# Patient Record
Sex: MALE | Race: WHITE | NOT HISPANIC OR LATINO | Employment: OTHER | URBAN - METROPOLITAN AREA
[De-identification: names, ages, dates, MRNs, and addresses within clinical notes are randomized per-mention and may not be internally consistent; named-entity substitution may affect disease eponyms.]

---

## 2023-02-21 LAB
CALCIDIOL (25 OH VITAMIN D3) (NG/ML) IN SER/PLAS: 51 NG/ML
COBALAMIN (VITAMIN B12) (PG/ML) IN SER/PLAS: 178 PG/ML (ref 211–911)
ERYTHROCYTE DISTRIBUTION WIDTH (RATIO) BY AUTOMATED COUNT: 13.2 % (ref 11.5–14.5)
ERYTHROCYTE MEAN CORPUSCULAR HEMOGLOBIN CONCENTRATION (G/DL) BY AUTOMATED: 33.3 G/DL (ref 32–36)
ERYTHROCYTE MEAN CORPUSCULAR VOLUME (FL) BY AUTOMATED COUNT: 97 FL (ref 80–100)
ERYTHROCYTES (10*6/UL) IN BLOOD BY AUTOMATED COUNT: 4.32 X10E12/L (ref 4.5–5.9)
HEMATOCRIT (%) IN BLOOD BY AUTOMATED COUNT: 42.1 % (ref 41–52)
HEMOGLOBIN (G/DL) IN BLOOD: 14 G/DL (ref 13.5–17.5)
LEUKOCYTES (10*3/UL) IN BLOOD BY AUTOMATED COUNT: 8.6 X10E9/L (ref 4.4–11.3)
PLATELETS (10*3/UL) IN BLOOD AUTOMATED COUNT: 178 X10E9/L (ref 150–450)

## 2023-06-23 DIAGNOSIS — I10 HYPERTENSION, UNSPECIFIED TYPE: Primary | ICD-10-CM

## 2023-06-23 RX ORDER — METOPROLOL TARTRATE 50 MG/1
50 TABLET ORAL DAILY
COMMUNITY
End: 2023-06-23 | Stop reason: SDUPTHER

## 2023-06-24 RX ORDER — METOPROLOL TARTRATE 50 MG/1
50 TABLET ORAL DAILY
Qty: 30 TABLET | Refills: 2 | Status: SHIPPED | OUTPATIENT
Start: 2023-06-24 | End: 2023-06-27 | Stop reason: SDUPTHER

## 2023-06-27 DIAGNOSIS — I10 HYPERTENSION, UNSPECIFIED TYPE: ICD-10-CM

## 2023-06-27 RX ORDER — METOPROLOL TARTRATE 50 MG/1
TABLET ORAL
Qty: 90 TABLET | Refills: 3 | Status: SHIPPED | OUTPATIENT
Start: 2023-06-27

## 2023-07-14 ENCOUNTER — PATIENT OUTREACH (OUTPATIENT)
Dept: PRIMARY CARE | Facility: CLINIC | Age: 83
End: 2023-07-14
Payer: MEDICARE

## 2023-07-14 RX ORDER — OLMESARTAN MEDOXOMIL 40 MG/1
1 TABLET ORAL DAILY
COMMUNITY
Start: 2018-08-27 | End: 2023-07-28 | Stop reason: SDUPTHER

## 2023-07-14 RX ORDER — ASPIRIN 81 MG/1
1 TABLET ORAL DAILY
COMMUNITY
Start: 2021-10-06

## 2023-07-14 RX ORDER — ROSUVASTATIN CALCIUM 10 MG/1
1 TABLET, FILM COATED ORAL DAILY
COMMUNITY
Start: 2017-04-11

## 2023-07-14 NOTE — PROGRESS NOTES
Discharge Facility: [facility]  Discharge Diagnosis: syncopal episode related to complete heart block, s/p dual chamber PPM  Admission Date: 7/9/23  Discharge Date: 7/13/23    PCP Appointment Date: 7/20/23  Specialist Appointment Date:   Follow-Up Appointment 02:           Physician/Dept/Service:   [facility] Anupam          Reason for Referral:   Incision Check          Scheduled Date/Time:   19-Jul-2023 08:30          Location:   [facility] [address]          Phone Number:   [phone]    Follow-Up Appointment 03:           Physician/Dept/Service:   [facility]          Reason for Referral:   Chest X-ray/Device Check          Scheduled Date/Time:   16-Oct-2023 08:00          Location:   [facility]          Phone Number:   [phone]    Follow-Up Appointment 04:           Physician/Dept/Service:   JERALD Souza          Reason for Referral:   follow up          Scheduled Date/Time:   16-Oct-2023 09:30  Hospital Encounter and Summary: Linked   See discharge assessment below for further details    Medications  Medications reviewed with patient/caregiver?: Yes (new/changes only) (7/14/2023 11:03 AM)  Is the patient having any side effects they believe may be caused by any medication additions or changes?: No (7/14/2023 11:03 AM)  Does the patient have all medications ordered at discharge?: Yes (7/14/2023 11:03 AM)  Care Management Interventions: No intervention needed (7/14/2023 11:03 AM)  Is the patient taking all medications as directed (includes completed medication regime)?: Yes (7/14/2023 11:03 AM)  Care Management Interventions: Provided patient education (7/14/2023 11:03 AM)  Medication Comments: Stop lisinopril (7/14/2023 11:03 AM)    Appointments  Does the patient have a primary care provider?: Yes (7/14/2023 11:03 AM)  Care Management Interventions: Verified appointment date/time/provider (7/14/2023 11:03 AM)  Care Management Interventions: Advised patient to keep appointment (7/14/2023 11:03  AM)    Self Management  Has home health visited the patient within 72 hours of discharge?: Not applicable (7/14/2023 11:03 AM)  What Durable Medical Equipment (DME) was ordered?: n/a (7/14/2023 11:03 AM)    Patient Teaching  Does the patient have access to their discharge instructions?: Yes (7/14/2023 11:03 AM)  Care Management Interventions: Reviewed instructions with patient (7/14/2023 11:03 AM)  What is the patient's perception of their health status since discharge?: Improving (7/14/2023 11:03 AM)  Is the patient/caregiver able to teach back the hierarchy of who to call/visit for symptoms/problems? PCP, Specialist, Home Health nurse, Urgent Care, ED, 911: Yes (7/14/2023 11:03 AM)  Patient/Caregiver Education Comments: Patient reports he is a little sore from the surgery but is doing better. (7/14/2023 11:03 AM)

## 2023-07-21 ENCOUNTER — TELEPHONE (OUTPATIENT)
Dept: PRIMARY CARE | Facility: CLINIC | Age: 83
End: 2023-07-21
Payer: MEDICARE

## 2023-07-22 NOTE — TELEPHONE ENCOUNTER
Pt needs to contact [facility], they should have several refills for each of those meds. He needs to ask them for refills. thanks

## 2023-07-28 DIAGNOSIS — I10 HYPERTENSION, UNSPECIFIED TYPE: ICD-10-CM

## 2023-07-28 RX ORDER — OLMESARTAN MEDOXOMIL 40 MG/1
40 TABLET ORAL DAILY
Qty: 90 TABLET | Refills: 0 | Status: SHIPPED | OUTPATIENT
Start: 2023-07-28 | End: 2023-10-16 | Stop reason: ALTCHOICE

## 2023-07-28 RX ORDER — METOPROLOL TARTRATE 50 MG/1
TABLET ORAL
Qty: 90 TABLET | Refills: 3 | Status: CANCELLED | OUTPATIENT
Start: 2023-07-28

## 2023-08-01 ENCOUNTER — PATIENT OUTREACH (OUTPATIENT)
Dept: PRIMARY CARE | Facility: CLINIC | Age: 83
End: 2023-08-01
Payer: MEDICARE

## 2023-08-01 NOTE — PROGRESS NOTES
Call regarding recent hospitalization. Patient has not had follow up with PCP.  At time of outreach call the patient feels as if their condition has improved since last visit.  Patient denies additional questions/concerns at this time.

## 2023-08-31 ENCOUNTER — PATIENT OUTREACH (OUTPATIENT)
Dept: PRIMARY CARE | Facility: CLINIC | Age: 83
End: 2023-08-31
Payer: MEDICARE

## 2023-08-31 NOTE — PROGRESS NOTES
Call regarding 2 month discharge follow up.   At time of outreach call the patient feels as if their condition has returned to baseline since last visit.  Reviewed the PCP appointment with the pt and addressed any questions or concerns.

## 2023-09-12 PROBLEM — N52.9 ERECTILE DYSFUNCTION: Status: ACTIVE | Noted: 2023-09-12

## 2023-09-12 PROBLEM — I25.10 CAD (CORONARY ARTERY DISEASE): Status: ACTIVE | Noted: 2023-09-12

## 2023-09-12 PROBLEM — Z95.0 PACEMAKER: Status: ACTIVE | Noted: 2023-09-12

## 2023-09-12 PROBLEM — I44.2 COMPLETE HEART BLOCK (MULTI): Status: ACTIVE | Noted: 2023-09-12

## 2023-09-12 PROBLEM — Z78.9 KNOWN MEDICAL PROBLEMS: Status: ACTIVE | Noted: 2023-09-12

## 2023-09-12 PROBLEM — Z79.899 DRUG THERAPY: Status: ACTIVE | Noted: 2023-09-12

## 2023-09-12 PROBLEM — R32 URINARY INCONTINENCE: Status: ACTIVE | Noted: 2023-09-12

## 2023-09-12 PROBLEM — E78.5 DYSLIPIDEMIA: Status: ACTIVE | Noted: 2023-09-12

## 2023-09-12 PROBLEM — E55.9 VITAMIN D DEFICIENCY: Status: ACTIVE | Noted: 2023-09-12

## 2023-09-12 PROBLEM — R55 SYNCOPE, NEAR: Status: ACTIVE | Noted: 2023-09-12

## 2023-09-12 PROBLEM — I65.29 ASYMPTOMATIC CAROTID ARTERY STENOSIS: Status: ACTIVE | Noted: 2023-09-12

## 2023-09-12 PROBLEM — Z78.9 NEVER SMOKED CIGARETTES: Status: ACTIVE | Noted: 2023-09-12

## 2023-09-12 PROBLEM — I10 HTN (HYPERTENSION): Status: ACTIVE | Noted: 2023-09-12

## 2023-09-12 PROBLEM — R41.82 ALTERED MENTAL STATUS: Status: ACTIVE | Noted: 2023-09-12

## 2023-09-12 PROBLEM — E66.3 OVERWEIGHT: Status: ACTIVE | Noted: 2023-09-12

## 2023-09-12 PROBLEM — R55 SYNCOPE: Status: ACTIVE | Noted: 2023-09-12

## 2023-09-12 PROBLEM — K21.9 GERD (GASTROESOPHAGEAL REFLUX DISEASE): Status: ACTIVE | Noted: 2023-09-12

## 2023-09-12 PROBLEM — I25.709: Status: ACTIVE | Noted: 2023-09-12

## 2023-09-12 PROBLEM — I10 MALIGNANT ESSENTIAL HYPERTENSION: Status: ACTIVE | Noted: 2023-09-12

## 2023-09-12 PROBLEM — E78.5 HYPERLIPIDEMIA: Status: ACTIVE | Noted: 2023-09-12

## 2023-09-12 PROBLEM — Z95.0 CARDIAC PACEMAKER: Status: ACTIVE | Noted: 2023-09-12

## 2023-09-12 RX ORDER — SILDENAFIL 50 MG/1
.5-2 TABLET, FILM COATED ORAL
COMMUNITY

## 2023-09-12 RX ORDER — ONDANSETRON 4 MG/1
1 TABLET, ORALLY DISINTEGRATING ORAL EVERY 4 HOURS PRN
COMMUNITY
Start: 2020-03-13 | End: 2023-10-16 | Stop reason: ALTCHOICE

## 2023-09-12 RX ORDER — OLMESARTAN MEDOXOMIL 20 MG/1
1 TABLET ORAL DAILY
COMMUNITY
Start: 2023-08-21 | End: 2024-03-12 | Stop reason: SDUPTHER

## 2023-09-12 RX ORDER — FAMOTIDINE 20 MG/1
1 TABLET, FILM COATED ORAL 2 TIMES DAILY
COMMUNITY
End: 2024-03-29 | Stop reason: SDUPTHER

## 2023-09-12 RX ORDER — ACETAMINOPHEN 325 MG/1
2 TABLET ORAL EVERY 4 HOURS PRN
COMMUNITY
Start: 2023-07-13 | End: 2023-10-16 | Stop reason: ALTCHOICE

## 2023-09-12 RX ORDER — BNT162B2 ORIGINAL AND OMICRON BA.4/BA.5 .1125; .1125 MG/2.25ML; MG/2.25ML
INJECTION, SUSPENSION INTRAMUSCULAR
COMMUNITY
Start: 2022-12-02 | End: 2024-04-16 | Stop reason: WASHOUT

## 2023-10-06 ENCOUNTER — PATIENT OUTREACH (OUTPATIENT)
Dept: PRIMARY CARE | Facility: CLINIC | Age: 83
End: 2023-10-06
Payer: MEDICARE

## 2023-10-06 NOTE — PROGRESS NOTES
Call placed regarding 90 days post discharge follow up call.  At time of outreach call the patient feels as if their condition has returned to baseline since initial visit with PCP or specialist.  Questions or concerns regarding recovery period addressed at this time.   Reviewed any PCP or specialists progress notes/labs/radiology reports if applicable and addressed any questions or concerns.

## 2023-10-16 ENCOUNTER — OFFICE VISIT (OUTPATIENT)
Dept: CARDIOLOGY | Facility: CLINIC | Age: 83
End: 2023-10-16
Payer: MEDICARE

## 2023-10-16 ENCOUNTER — HOSPITAL ENCOUNTER (OUTPATIENT)
Dept: CARDIOLOGY | Facility: HOSPITAL | Age: 83
Discharge: HOME | End: 2023-10-16
Payer: MEDICARE

## 2023-10-16 ENCOUNTER — HOSPITAL ENCOUNTER (OUTPATIENT)
Dept: RADIOLOGY | Facility: HOSPITAL | Age: 83
Discharge: HOME | End: 2023-10-16
Payer: MEDICARE

## 2023-10-16 VITALS
HEART RATE: 68 BPM | WEIGHT: 174 LBS | SYSTOLIC BLOOD PRESSURE: 108 MMHG | DIASTOLIC BLOOD PRESSURE: 62 MMHG | BODY MASS INDEX: 27.31 KG/M2 | HEIGHT: 67 IN

## 2023-10-16 DIAGNOSIS — I10 MALIGNANT ESSENTIAL HYPERTENSION: ICD-10-CM

## 2023-10-16 DIAGNOSIS — I25.709: ICD-10-CM

## 2023-10-16 DIAGNOSIS — Z95.0 PACEMAKER: ICD-10-CM

## 2023-10-16 DIAGNOSIS — I44.2 COMPLETE HEART BLOCK (MULTI): ICD-10-CM

## 2023-10-16 DIAGNOSIS — Z95.0 CARDIAC PACEMAKER: Primary | ICD-10-CM

## 2023-10-16 DIAGNOSIS — Z78.9 NEVER SMOKED CIGARETTES: ICD-10-CM

## 2023-10-16 DIAGNOSIS — I1A.0 RESISTANT HYPERTENSION: ICD-10-CM

## 2023-10-16 DIAGNOSIS — I44.1 MOBITZ TYPE II ATRIOVENTRICULAR BLOCK: ICD-10-CM

## 2023-10-16 DIAGNOSIS — I44.2 ATRIOVENTRICULAR BLOCK, COMPLETE (MULTI): ICD-10-CM

## 2023-10-16 DIAGNOSIS — Z95.0 PRESENCE OF CARDIAC PACEMAKER: ICD-10-CM

## 2023-10-16 DIAGNOSIS — E78.2 MIXED HYPERLIPIDEMIA: ICD-10-CM

## 2023-10-16 DIAGNOSIS — E78.5 DYSLIPIDEMIA: ICD-10-CM

## 2023-10-16 DIAGNOSIS — R55 SYNCOPE, UNSPECIFIED SYNCOPE TYPE: ICD-10-CM

## 2023-10-16 PROCEDURE — 71046 X-RAY EXAM CHEST 2 VIEWS: CPT | Performed by: RADIOLOGY

## 2023-10-16 PROCEDURE — 1159F MED LIST DOCD IN RCRD: CPT | Performed by: NURSE PRACTITIONER

## 2023-10-16 PROCEDURE — 1036F TOBACCO NON-USER: CPT | Performed by: NURSE PRACTITIONER

## 2023-10-16 PROCEDURE — 3074F SYST BP LT 130 MM HG: CPT | Performed by: NURSE PRACTITIONER

## 2023-10-16 PROCEDURE — 99214 OFFICE O/P EST MOD 30 MIN: CPT | Performed by: NURSE PRACTITIONER

## 2023-10-16 PROCEDURE — 1160F RVW MEDS BY RX/DR IN RCRD: CPT | Performed by: NURSE PRACTITIONER

## 2023-10-16 PROCEDURE — 93280 PM DEVICE PROGR EVAL DUAL: CPT

## 2023-10-16 PROCEDURE — 3078F DIAST BP <80 MM HG: CPT | Performed by: NURSE PRACTITIONER

## 2023-10-16 PROCEDURE — 93290 INTERROG DEV EVAL ICPMS IP: CPT | Performed by: INTERNAL MEDICINE

## 2023-10-16 PROCEDURE — 93280 PM DEVICE PROGR EVAL DUAL: CPT | Performed by: INTERNAL MEDICINE

## 2023-10-16 PROCEDURE — 71046 X-RAY EXAM CHEST 2 VIEWS: CPT

## 2023-10-16 NOTE — PROGRESS NOTES
CARDIOLOGY OFFICE VISIT      CHIEF COMPLAINT  Chief Complaint   Patient presents with    Device Check    Hospital Follow-up     Patient here today for a 91 day follow up         HISTORY OF PRESENT ILLNESS  HPI  The patient is a 83-year-old  male who is followed for syncope secondary to complete heart block.  He underwent placement of a transvenous pacemaker followed by dual-chamber pacemaker implant on July 12, 2023 and presents to the office today for follow-up evaluation.  He denies dizziness, lightheadedness, near or marvin syncope, chest pain, palpitations, shortness of breath, abdominal distention, or lower extremity edema since undergoing pacemaker implant.  He is accompanied by his wife for today's office visit.  The patient did not undergo a PA and lateral chest x-ray prior to today's visit.  Past Medical History  Past Medical History:   Diagnosis Date    Adverse effect of unspecified drugs, medicaments and biological substances, initial encounter 08/27/2018    Drug reaction, initial encounter    Personal history of diseases of the blood and blood-forming organs and certain disorders involving the immune mechanism 08/30/2022    History of anemia    Personal history of other specified conditions 12/09/2019    History of abdominal pain       Social History  Social History     Tobacco Use    Smoking status: Never    Smokeless tobacco: Never   Substance Use Topics    Alcohol use: Yes     Comment: rarely    Drug use: Not Currently       Family History     Family History   Problem Relation Name Age of Onset    Asthma Mother      Other (Death of natural cause) Mother      Hypertension Father      Coronary artery disease Father      Other (myocardial infarction) Father      Stroke Other Grandparent         CVA        Allergies:  Allergies   Allergen Reactions    Lisinopril Unknown        Outpatient Medications:  Current Outpatient Medications   Medication Instructions    aspirin 81 mg EC tablet 1 tablet,  oral, Daily    Crestor 10 mg tablet 1 tablet, oral, Daily    famotidine (Pepcid) 20 mg tablet 1 tablet, oral, 2 times daily, if needed for BREAKTHROUGH HEARTBURN    metoprolol tartrate (Lopressor) 50 mg tablet 1 po qd    olmesartan (BENIcar) 20 mg tablet 1 tablet, oral, Daily    Pfizer COVID Bival,12y up,,PF, 30 mcg/0.3 mL suspension vaccine     sildenafil (Viagra) 50 mg tablet 0.5-2 tablets, oral, 30 MINUTES prior to intercourse          REVIEW OF SYSTEMS  Review of Systems   All other systems reviewed and are negative.        VITALS  Vitals:    10/16/23 0916   BP: 108/62   Pulse: 68       PHYSICAL EXAM  Vitals and nursing note reviewed.   Constitutional:       Appearance: Normal appearance.   HENT:      Head: Normocephalic.   Neck:      Vascular: No JVD.   Cardiovascular:      Rate and Rhythm: Normal rate and regular rhythm.      Pulses: Normal pulses.      Heart sounds: Normal heart sounds.   Pulmonary:      Effort: Pulmonary effort is normal.      Breath sounds: Normal breath sounds.   Abdominal:      General: Bowel sounds are normal.      Palpations: Abdomen is soft.   Musculoskeletal:         General: Normal range of motion.      Cervical back: Normal range of motion.   Skin:     General: Skin is warm and dry.  Left subclavian pacemaker pocket is well-healed without redness swelling or drainage.  Neurological:      General: No focal deficit present.      Mental Status: She is alert and oriented to person, place, and time.      Motor: Motor function is intact.   Psychiatric:         Attention and Perception: Attention and perception normal.         Mood and Affect: Mood and affect normal.         Speech: Speech normal.         Behavior: Behavior normal. Behavior is cooperative.         Thought Content: Thought content normal.         Cognition and Memory: Cognition and memory normal.     Labs and testing: Twelve-lead EKG reveals atrial and ventricular pacing at 68 bpm.  Prolonged AV conduction was noted with a  OR interval of 276 ms.  QRS duration 192 ms,  ms, QTc 493 ms.  Pacemaker interrogation dated October 16, 2023 reveals atrial pacing 35.5% and ventricular pacing 70.67%.  No atrial or ventricular arrhythmic events were noted.  2D echocardiogram dated July 10, 2023 revealed an ejection fraction of 60 to 65%, moderate left atrial enlargement, negative bubble study, moderate mitral valve thickening, moderate to severe calcification of the posterior mitral valve leaflet, mild to moderate MR and aortic valve sclerosis.      ASSESSMENT AND PLAN    Clinical impressions:  1.  Syncope secondary to complete heart block status post dual-chamber pacemaker implant (Stupiltronic Tanya XT DR MRI) on July 12, 2023 utilizing an epicardial LV lead placed on December 15, 2008 and placement of a new right atrial lead on July 12, 2023.  2.  Coronary artery disease status post 5 vessel coronary artery bypass graft on December 12, 2008 consisting of a LIMA graft to the LAD, saphenous vein graft to the high lateral, circumflex, terminal circumflex, and PDA of the RCA with left atrial appendage ligation.  Lexiscan stress test dated February 11, 2020 revealed an ejection fraction of 66% with no acute ischemic changes or infarct patterns noted.  3.  Dyslipidemia on statin.  4.  Hypertension, controlled with a blood pressure today of 108/62.  5.  Valvular heart disease consisting of moderate mitral valve thickening, moderate to severe calcification of the posterior mitral valve leaflet, mild to moderate MR and aortic valve sclerosis per 2D echocardiogram dated July 10, 2023.  6.  Moderate left atrial enlargement per 2D echocardiogram dated July 10, 2023.  7.  Normal left ventricular function with an ejection fraction of 60 to 65% per 2D echocardiogram dated July 10, 2023.  8.  Overweight with a BMI of 27.25.    Recommendations:  1.  Continue current medications as prescribed.  2.  Obtain a remote device check on July 17, 2024 and an in  clinic check in 6 months prior to the office visit with Dr. Aguiar.  The patient states that he does not have a remote monitor at home and we are unable to locate the joselyn on his cell phone.  I did check with the [facility] device clinic staff who states that the monitor is transmitting therefore it must be on the patient's cell phone.  We will contact [employer] and request the phone joselyn be discontinued and the patient be sent a home monitor for patient convenience.  3.  Follow-up in office with Dr. Aguiar in 6 months or sooner if needed.  4.  Obtain a Lexiscan stress test on January 16, 2024 at 7:30 AM as scheduled.  5.  Obtain a 2D echocardiogram on January 16, 2024 at 8:15 AM as scheduled.  6.  Follow-up in office with Dr. Menjivar on January 23, 2024 at 12 PM as scheduled.  7.  Discussed routine device follow up is scheduled every 3-4 months.  Inclinic checks alternate with remote (home) checks.  Inclinic checks will be scheduled prior to the office visit with Electrophysiology.  8.  Instructed patient to always carry the device card in their wallet.  No wanding of the device at the airport or [facility].  Do not carry cell phone in the shirt pocket on the side of the implant.  Utilize the ear on the opposite side of the device.  Refrain from environments with electromagnetic interference ([place]).  9.  Patient was provided with my direct phone number for any additional questions or concerns.    Evaluation and note by Libby Dallas CNP  **Please excuse any errors in grammar or translation related to this dictation.  Voice recognition software was utilized to prepare this document.**

## 2023-10-18 ENCOUNTER — TELEPHONE (OUTPATIENT)
Dept: CARDIOLOGY | Facility: CLINIC | Age: 83
End: 2023-10-18
Payer: MEDICARE

## 2023-10-18 NOTE — TELEPHONE ENCOUNTER
Called [employer] [phone] as requested to have a home monitoring system mailed to patient's home. Patient unable to use the phone joselyn. Per [employer] rep, device will be mailed to the patient's home in 7-10 business days

## 2023-10-19 ENCOUNTER — TELEPHONE (OUTPATIENT)
Dept: CARDIOLOGY | Facility: CLINIC | Age: 83
End: 2023-10-19
Payer: MEDICARE

## 2023-10-19 NOTE — TELEPHONE ENCOUNTER
----- Message from Rebekah Aguiar MD sent at 10/17/2023  8:16 PM EDT -----  Xray ok  ----- Message -----  From: Interface, Radiology Results In  Sent: 10/17/2023   3:17 PM EDT  To: Rebekah Aguiar MD  I called patient and gave results.

## 2024-01-03 ENCOUNTER — DOCUMENTATION (OUTPATIENT)
Dept: RADIOLOGY | Facility: CLINIC | Age: 84
End: 2024-01-03
Payer: MEDICARE

## 2024-01-03 NOTE — PROGRESS NOTES
Pt to be switched to MPL instead of MPX per Dr Menjivar (message relayed by Kamille JEAN) due to pt having a PPM placed since order was put in

## 2024-01-05 ENCOUNTER — OFFICE VISIT (OUTPATIENT)
Dept: PRIMARY CARE | Facility: CLINIC | Age: 84
End: 2024-01-05
Payer: MEDICARE

## 2024-01-05 VITALS
HEIGHT: 67 IN | SYSTOLIC BLOOD PRESSURE: 138 MMHG | BODY MASS INDEX: 28.09 KG/M2 | DIASTOLIC BLOOD PRESSURE: 75 MMHG | HEART RATE: 82 BPM | WEIGHT: 179 LBS | OXYGEN SATURATION: 98 %

## 2024-01-05 DIAGNOSIS — E55.9 VITAMIN D DEFICIENCY: ICD-10-CM

## 2024-01-05 DIAGNOSIS — Z12.5 SCREENING FOR PROSTATE CANCER: ICD-10-CM

## 2024-01-05 DIAGNOSIS — L82.1 SEBORRHEIC KERATOSES: Primary | ICD-10-CM

## 2024-01-05 DIAGNOSIS — Z79.899 DRUG THERAPY: ICD-10-CM

## 2024-01-05 DIAGNOSIS — Z13.9 SCREENING FOR CONDITION: ICD-10-CM

## 2024-01-05 DIAGNOSIS — Z00.00 PREVENTATIVE HEALTH CARE: ICD-10-CM

## 2024-01-05 PROCEDURE — 3075F SYST BP GE 130 - 139MM HG: CPT | Performed by: FAMILY MEDICINE

## 2024-01-05 PROCEDURE — 99213 OFFICE O/P EST LOW 20 MIN: CPT | Performed by: FAMILY MEDICINE

## 2024-01-05 PROCEDURE — 1036F TOBACCO NON-USER: CPT | Performed by: FAMILY MEDICINE

## 2024-01-05 PROCEDURE — 3078F DIAST BP <80 MM HG: CPT | Performed by: FAMILY MEDICINE

## 2024-01-05 NOTE — PATIENT INSTRUCTIONS
Seborrheic keratosis, approximately 1.5 cm diameter on back.  Was noticed about a year ago.  Not itchy or problematic. -->>  Nothing needs done at this moment.  Do strongly recommend seeing dermatology for annual skin cancer screenings.    regarding previous labs:  - h/o mild anemia, resolved, vitamin B-12 deficiency, 178, was 202, goal is 400 or more. Patient not taking any B12. Feels he has excellent energy levels. -->> Since patient is not having any problems with this and is no longer anemic, we do not need to recheck B12. If you find you could use more energy you might try B12 1000 mcg daily just to see if it is of any benefit.  - Vitamin D deficiency. 51, was 55, was 49, 42, 43, 31 before that. Goal is . Is on vitamin D, not sure of the dose. -->>  Make sure to bring in your bottle of vitamin D  So we can figure out how much you are taking.  Will recheck in 6 months.   - Drug therapy, screening for condition. A1c is 5.2, was 5.5, so no diabetes. -->> will repeat annual labs around jul 2023   - Hyperlipidemia, HDL 48, was 66, was 56, goal is 45 or more. LDL is 56, was 106, 67, goal is 70 or less. He is on Crestor/rosuvastatin 10 mg daily. Looks like the Crestor as prescribed by cardiology as we have not prescribed it recently. -->> Continue current treatment. If not improved next labs, can consider increasing Crestor/rosuvastatin to 20 mg daily. Will refill the Crestor if it turns out cardiology is not filling it. Be sure to let us know what dose you are on with any meds you call to get refill.  -Screening for prostate cancer. PSA 0.5, totally normal. We had previously discussed that we will go to just check it every few years, so we will plan to recheck PSA with annual labs later this year.    Coronary artery disease, bundle branch block, had syncopal episode summer 2023, now has pacemaker.  Doing well.  Follow-up with cardiology/Dr. Menjivar and Dr. Aguiar.      - Will schedule Medicare wellness visit with  annual preventative and annual lab review in about 6 months.  - Patient will come in about a week before the appointment to get fasting annual labs including a PSA's screening.

## 2024-01-05 NOTE — PROGRESS NOTES
0Subjective   Patient ID: Jones Avila is a 83 y.o. male who presents for Spot on Back (Pt in today with c/o calcium spot on back).    Review of Systems  Denies N/V/D/C, no HA/S/V, denies rashes/lesions, no CP/SOB. Denies fevers/chills.  Positive for raised hyperpigmented patch on back.  All other systems were negative.    Objective   Physical Exam  Gen: NAD  eyes: EOMI, PERRLA  ENT: hearing grossly intact, no nasal discharge  resp: CTABL, without R/R  heart: RRR without MRG  GI: abd: S/ND/NT, BS+  lymph: no axillary, cervical, supraclavicular lymphadenopathy noted   MS: gait grossly WNL,  derm: Possible 1.5 cm hyperpigmented raised slightly scaly patch on right side of back around T10.  neuro: CN II-XII grossly intact  psych: A&Ox3    Assessment/Plan   Diagnoses and all orders for this visit:  Seborrheic keratoses  Drug therapy  -     CBC and Auto Differential; Future  -     Comprehensive Metabolic Panel; Future  -     Magnesium; Future  -     Urinalysis with Reflex Microscopic; Future  Screening for condition  -     TSH with reflex to Free T4 if abnormal; Future  -     Lipid Panel; Future  -     Hemoglobin A1C; Future  Preventative health care  -     CBC and Auto Differential; Future  -     Comprehensive Metabolic Panel; Future  -     TSH with reflex to Free T4 if abnormal; Future  -     Magnesium; Future  -     Lipid Panel; Future  -     Hemoglobin A1C; Future  -     Urinalysis with Reflex Microscopic; Future  -     Vitamin D 25-Hydroxy,Total (for eval of Vitamin D levels); Future  -     Prostate Spec.Ag,Screen; Future  Screening for prostate cancer  -     Prostate Spec.Ag,Screen; Future  Vitamin D deficiency  -     Vitamin D 25-Hydroxy,Total (for eval of Vitamin D levels); Future           Toi Anderson DO 01/05/24 10:59 AM

## 2024-01-16 ENCOUNTER — ANCILLARY PROCEDURE (OUTPATIENT)
Dept: RADIOLOGY | Facility: CLINIC | Age: 84
End: 2024-01-16
Payer: MEDICARE

## 2024-01-16 ENCOUNTER — HOSPITAL ENCOUNTER (OUTPATIENT)
Dept: CARDIOLOGY | Facility: CLINIC | Age: 84
Discharge: HOME | End: 2024-01-16
Payer: MEDICARE

## 2024-01-16 VITALS
HEIGHT: 67 IN | SYSTOLIC BLOOD PRESSURE: 115 MMHG | WEIGHT: 179 LBS | DIASTOLIC BLOOD PRESSURE: 60 MMHG | BODY MASS INDEX: 28.09 KG/M2

## 2024-01-16 DIAGNOSIS — I25.709: ICD-10-CM

## 2024-01-16 DIAGNOSIS — I25.709 ATHEROSCLEROSIS OF CORONARY ARTERY BYPASS GRAFT(S), UNSPECIFIED, WITH UNSPECIFIED ANGINA PECTORIS (CMS-HCC): ICD-10-CM

## 2024-01-16 DIAGNOSIS — E66.3 OVERWEIGHT: ICD-10-CM

## 2024-01-16 DIAGNOSIS — I25.10 CAD (CORONARY ARTERY DISEASE): Primary | ICD-10-CM

## 2024-01-16 DIAGNOSIS — I10 ESSENTIAL (PRIMARY) HYPERTENSION: ICD-10-CM

## 2024-01-16 DIAGNOSIS — I25.10 ATHEROSCLEROTIC HEART DISEASE OF NATIVE CORONARY ARTERY WITHOUT ANGINA PECTORIS: ICD-10-CM

## 2024-01-16 DIAGNOSIS — I25.10 CAD (CORONARY ARTERY DISEASE): ICD-10-CM

## 2024-01-16 DIAGNOSIS — I10 HTN (HYPERTENSION): ICD-10-CM

## 2024-01-16 LAB
AORTIC VALVE MEAN GRADIENT: 5
AORTIC VALVE PEAK VELOCITY: 1.46
AV PEAK GRADIENT: 8.5
AVA (PEAK VEL): 2
AVA (VTI): 1.76
EJECTION FRACTION APICAL 4 CHAMBER: 63
EJECTION FRACTION: 62
LEFT VENTRICLE INTERNAL DIMENSION DIASTOLE: 3.5 (ref 3.5–6)
LEFT VENTRICULAR OUTFLOW TRACT DIAMETER: 1.8
MITRAL VALVE E/A RATIO: 0.6
MITRAL VALVE E/E' RATIO: 13.8
RIGHT VENTRICLE PEAK SYSTOLIC PRESSURE: 27.4

## 2024-01-16 PROCEDURE — 93306 TTE W/DOPPLER COMPLETE: CPT

## 2024-01-16 PROCEDURE — 3430000001 HC RX 343 DIAGNOSTIC RADIOPHARMACEUTICALS: Performed by: INTERNAL MEDICINE

## 2024-01-16 PROCEDURE — A9502 TC99M TETROFOSMIN: HCPCS | Performed by: INTERNAL MEDICINE

## 2024-01-16 PROCEDURE — 93306 TTE W/DOPPLER COMPLETE: CPT | Performed by: INTERNAL MEDICINE

## 2024-01-16 PROCEDURE — 2500000004 HC RX 250 GENERAL PHARMACY W/ HCPCS (ALT 636 FOR OP/ED): Performed by: INTERNAL MEDICINE

## 2024-01-16 PROCEDURE — 78452 HT MUSCLE IMAGE SPECT MULT: CPT

## 2024-01-16 PROCEDURE — 93017 CV STRESS TEST TRACING ONLY: CPT

## 2024-01-16 RX ORDER — REGADENOSON 0.08 MG/ML
0.4 INJECTION, SOLUTION INTRAVENOUS ONCE
Status: COMPLETED | OUTPATIENT
Start: 2024-01-16 | End: 2024-01-16

## 2024-01-16 RX ADMIN — TETROFOSMIN 10.3 MILLICURIE: 0.23 INJECTION, POWDER, LYOPHILIZED, FOR SOLUTION INTRAVENOUS at 08:11

## 2024-01-16 RX ADMIN — REGADENOSON 0.4 MG: 0.08 INJECTION, SOLUTION INTRAVENOUS at 09:25

## 2024-01-16 RX ADMIN — TETROFOSMIN 33.3 MILLICURIE: 0.23 INJECTION, POWDER, LYOPHILIZED, FOR SOLUTION INTRAVENOUS at 09:25

## 2024-01-23 ENCOUNTER — OFFICE VISIT (OUTPATIENT)
Dept: CARDIOLOGY | Facility: CLINIC | Age: 84
End: 2024-01-23
Payer: MEDICARE

## 2024-01-23 VITALS
HEIGHT: 65 IN | SYSTOLIC BLOOD PRESSURE: 98 MMHG | DIASTOLIC BLOOD PRESSURE: 62 MMHG | BODY MASS INDEX: 29.57 KG/M2 | HEART RATE: 68 BPM | WEIGHT: 177.5 LBS

## 2024-01-23 DIAGNOSIS — I65.29 ASYMPTOMATIC CAROTID ARTERY STENOSIS, UNSPECIFIED LATERALITY: ICD-10-CM

## 2024-01-23 DIAGNOSIS — I44.2 COMPLETE HEART BLOCK (MULTI): ICD-10-CM

## 2024-01-23 DIAGNOSIS — I25.10 CORONARY ARTERY DISEASE INVOLVING NATIVE CORONARY ARTERY OF NATIVE HEART WITHOUT ANGINA PECTORIS: ICD-10-CM

## 2024-01-23 DIAGNOSIS — Z95.0 CARDIAC PACEMAKER: ICD-10-CM

## 2024-01-23 DIAGNOSIS — E78.2 MIXED HYPERLIPIDEMIA: ICD-10-CM

## 2024-01-23 DIAGNOSIS — I25.709: ICD-10-CM

## 2024-01-23 DIAGNOSIS — Z78.9 NEVER SMOKED CIGARETTES: ICD-10-CM

## 2024-01-23 DIAGNOSIS — I10 PRIMARY HYPERTENSION: ICD-10-CM

## 2024-01-23 PROCEDURE — 3074F SYST BP LT 130 MM HG: CPT | Performed by: INTERNAL MEDICINE

## 2024-01-23 PROCEDURE — 99214 OFFICE O/P EST MOD 30 MIN: CPT | Performed by: INTERNAL MEDICINE

## 2024-01-23 PROCEDURE — 1160F RVW MEDS BY RX/DR IN RCRD: CPT | Performed by: INTERNAL MEDICINE

## 2024-01-23 PROCEDURE — 1159F MED LIST DOCD IN RCRD: CPT | Performed by: INTERNAL MEDICINE

## 2024-01-23 PROCEDURE — 3078F DIAST BP <80 MM HG: CPT | Performed by: INTERNAL MEDICINE

## 2024-01-23 PROCEDURE — 1036F TOBACCO NON-USER: CPT | Performed by: INTERNAL MEDICINE

## 2024-01-23 NOTE — PATIENT INSTRUCTIONS
Continue same medications/treatment.  Patient educated on proper medication use.  Patient educated on risk factor modification.  Please bring any lab results from other providers/physicians to your next appointment.    Please bring all medicines, vitamins, and herbal supplements with you when you come to the office.    Prescriptions will not be filled unless you are compliant with your follow up appointments or have a follow up appointment scheduled as per instruction of your physician. Refills should be requested at the time of your visit.    Follow up in 6 months    I, TERRY MESSER RN, AM SCRIBING FOR AND IN THE PRESENCE OF DR. CARLOS EM, DO, FACC

## 2024-01-23 NOTE — PROGRESS NOTES
Patient:  Jones Avila  YOB: 1940  MRN: [id number]       Chief Complaint/Active Symptoms:       Jones Avila is a 83 y.o. male who returns today for cardiac follow-up.  Patient has a history of coronary disease remote coronary bypass surgery.  He is here to review recent cardiac testing including nuclear scan and echo.  He claims no angina or other symptomatology at this time.      Objective:     There were no vitals filed for this visit.    There were no vitals filed for this visit.    Allergies:     No Known Allergies       Medications:     Current Outpatient Medications   Medication Instructions    aspirin 81 mg EC tablet 1 tablet, oral, Daily    Crestor 10 mg tablet 1 tablet, oral, Daily    famotidine (Pepcid) 20 mg tablet 1 tablet, oral, 2 times daily, if needed for BREAKTHROUGH HEARTBURN    metoprolol tartrate (Lopressor) 50 mg tablet 1 po qd    olmesartan (BENIcar) 20 mg tablet 1 tablet, oral, Daily    Pfizer COVID Bival,12y up,,PF, 30 mcg/0.3 mL suspension vaccine     sildenafil (Viagra) 50 mg tablet 0.5-2 tablets, oral, 30 MINUTES prior to intercourse       Physical Examination:   Constitutional:       Appearance: Healthy appearance. Not in distress.   Neck:      Vascular: No JVR. JVD normal.   Pulmonary:      Effort: Pulmonary effort is normal.      Breath sounds: Normal breath sounds. No wheezing. No rhonchi. No rales.   Chest:      Chest wall: Not tender to palpatation.   Cardiovascular:      PMI at left midclavicular line. Normal rate. Regular rhythm. Normal S1. Normal S2.       Murmurs: There is no murmur.      No gallop.  No click. No rub.   Pulses:     Intact distal pulses.   Edema:     Peripheral edema absent.   Abdominal:      General: Bowel sounds are normal.      Palpations: Abdomen is soft.      Tenderness: There is no abdominal tenderness.   Musculoskeletal: Normal range of motion.         General: No tenderness. Skin:     General: Skin is warm and dry.   Neurological:       "General: No focal deficit present.      Mental Status: Alert and oriented to person, place and time.            Lab:     CBC:   Lab Results   Component Value Date    WBC 11.7 (H) 07/13/2023    RBC 4.15 (L) 07/13/2023    HGB 13.5 07/13/2023    HCT 38.8 (L) 07/13/2023     07/13/2023        CMP:    Lab Results   Component Value Date     07/13/2023    K 4.1 07/13/2023     07/13/2023    CO2 26 07/13/2023    BUN 12 07/13/2023    CREATININE 0.87 07/13/2023    GLUCOSE 100 (H) 07/13/2023    CALCIUM 8.7 07/13/2023       Magnesium:    Lab Results   Component Value Date    MG 2.05 07/13/2023       Lipid Profile:    Lab Results   Component Value Date    TRIG 72 07/06/2022    HDL 48.0 07/06/2022       TSH:    Lab Results   Component Value Date    TSH 1.28 07/06/2022       BNP:   No results found for: \"BNP\"     PT/INR:    Lab Results   Component Value Date    PROTIME 13.3 (H) 07/09/2023    INR 1.2 (H) 07/09/2023       HgBA1c:    Lab Results   Component Value Date    HGBA1C 5.2 07/06/2022       BMP:  Lab Results   Component Value Date     07/13/2023     07/12/2023     07/11/2023    K 4.1 07/13/2023    K 4.0 07/12/2023    K 3.8 07/11/2023     07/13/2023     07/12/2023     07/11/2023    CO2 26 07/13/2023    CO2 27 07/12/2023    CO2 27 07/11/2023    BUN 12 07/13/2023    BUN 16 07/12/2023    BUN 14 07/11/2023    CREATININE 0.87 07/13/2023    CREATININE 0.98 07/12/2023    CREATININE 1.01 07/11/2023       CBC:  Lab Results   Component Value Date    WBC 11.7 (H) 07/13/2023    WBC 10.9 07/12/2023    WBC 9.9 07/11/2023    RBC 4.15 (L) 07/13/2023    RBC 4.36 (L) 07/12/2023    RBC 4.19 (L) 07/11/2023    HGB 13.5 07/13/2023    HGB 14.1 07/12/2023    HGB 13.7 07/11/2023    HCT 38.8 (L) 07/13/2023    HCT 41.4 07/12/2023    HCT 39.7 (L) 07/11/2023    MCV 93 07/13/2023    MCV 95 07/12/2023    MCV 95 07/11/2023    MCHC 34.8 07/13/2023    MCHC 34.1 07/12/2023    MCHC 34.5 07/11/2023    RDW 12.8 " 07/13/2023    RDW 12.9 07/12/2023    RDW 12.9 07/11/2023     07/13/2023     07/12/2023     07/11/2023       Cardiac Enzymes:    Lab Results   Component Value Date    TROPHS 10 07/09/2023    TROPHS 10 07/09/2023       Hepatic Function Panel:    Lab Results   Component Value Date    ALKPHOS 59 07/09/2023    ALT 20 07/09/2023    AST 27 07/09/2023    PROT 6.6 07/09/2023    BILITOT 0.5 07/09/2023    BILIDIR 0.2 07/09/2023         Diagnostic Studies:                [address], [address], [place] [address]           Tel [phone] Fax [phone]     TRANSTHORACIC ECHOCARDIOGRAM REPORT        Patient Name:      LOKESH GOINS          [place] Physician:    [id number] Javier Warren MD, [facility]  Study Date:        1/16/2024            Ordering Provider:    [id number] ROBERT EM  MRN/PID:           [id number]             Fellow:  Accession#:        [id number]         Nurse:  Date of Birth/Age: 1940 / 83 years Sonographer:          Robert Jain  Gender:            M                    Additional Staff:  Height:            170.18 cm            Admit Date:           1/16/2024  Weight:            81.19 kg             Admission Status:     Outpatient  BSA:               1.93 m2              Department Location:  [facility]  Blood Pressure: 115 /60 mmHg     Study Type:    TRANSTHORACIC ECHO (TTE) COMPLETE  Diagnosis/ICD: Atherosclerotic heart disease of native coronary artery without                 angina pectoris-I25.10; Atherosclerosis of coronary artery bypass                 graft(s), unspecified, with unspecified angina pectoris-I25.709  Indication:    CAD, Overweight, Atherosclerosis of CABG w/ angina  CPT Codes:     Echo Complete w Full Doppler-[id number]      Patient History:  Pacer/Defib:       Permanent pacemaker  Pertinent History: CAD, HTN, Hyperlipidemia, Syncope and Overweight,                     Atherosclerosis of CABG w/ angina, CHB.     Study Detail: The following Echo studies were performed: 2D, M-Mode, Doppler and                color flow. The patient was awake.        PHYSICIAN INTERPRETATION:  Left Ventricle: Left ventricular systolic function is normal, with an estimated ejection fraction of 60-65%. There are no regional wall motion abnormalities. The left ventricular cavity size is normal. The left ventricular septal wall thickness is normal. There is normal left ventricular posterior wall thickness. Spectral Doppler shows an impaired relaxation pattern of left ventricular diastolic filling.  Left Atrium: The left atrium is normal in size. Left atrial volume index 24.4 mL/m2.  Right Ventricle: The right ventricle is normal in size. There is normal right ventricular global systolic function. Normal right ventricular chamber size and function.  Right Atrium: The right atrium is normal in size.  Aortic Valve: The aortic valve is trileaflet. There is mild aortic valve cusp calcification. There is no evidence of aortic valve regurgitation. The peak instantaneous gradient of the aortic valve is 8.5 mmHg. The mean gradient of the aortic valve is 5.0 mmHg.  Mitral Valve: The mitral valve is normal in structure. There is moderate to severe calcification of the posterior mitral valve leaflet. There is moderate mitral annular calcification. There is mild mitral valve regurgitation. Mild (1+) mitral regurgitation.  Tricuspid Valve: The tricuspid valve is structurally normal. There is mild tricuspid regurgitation. Mild (1+) tricuspid regurgitation with estimated RVSP of 27 mmHg.  Pulmonic Valve: The pulmonic valve is structurally normal. There is mild pulmonic valve regurgitation.  Pericardium: There is no pericardial effusion noted.  Aorta: The aortic root is  normal.  Systemic Veins: The inferior vena cava appears to be of normal size. There is IVC inspiratory collapse greater than 50%.  Additional Comments: Comparison study dated July 10, 2023 showed estimated LV ejection fraction 60 to 65%. 2+ mitral regurgitation and 1+ tricuspid regurgitation. Moderate to severe thickening of the posterior mitral valve leaflet.        CONCLUSIONS:   1. Left ventricular systolic function is normal with a 60-65% estimated ejection fraction.   2. Spectral Doppler shows an impaired relaxation pattern of left ventricular diastolic filling.   3. Normal right ventricular chamber size and function.   4. Mild (1+) mitral regurgitation.   5. There is moderate mitral annular calcification.   6. There is moderate to severe calcification of the posterior mitral valve leaflet.   7. Mild (1+) tricuspid regurgitation with estimated RVSP of 27 mmHg.      Interpreted By:  Javier Warren and Arthur Skyler   STUDY:  MYOCARDIAL PERFUSION STRESS TEST WITH LEXISCAN      Performing facility:  [facility],  [address] [place] [address] [address],  [address] Provider:  Robert Menjivar DO, [facility]  PCP:  Dr. JESSICA MARTINEZ  Supervising provider:  Tara Batres MD, FACC      INDICATION:  CAD W/O Angina  HTN  Atherosclerosis Of CABG      HISTORY:  Gender:  M; Age:  84 y/o ; Height:  .2 cm cm; Weight:   WT  81.194 kg kg.      High Cholesterol;  HTN;  CAD;  Syncope;07/2023  Abnormal EKG;  Family  HX CAD;  AVS  Carotid Stenosis  PPM 07/2023      Denies smoking.      Cardiac catheterization on 2008.  CABG on 2008 X4.      COMPARISON:  Previous nuclear testing completed on2020 at [facility].          ACCESSION NUMBER(S):  [id number]      ORDERING CLINICIAN:  ROBERT MENJIVAR      TECHNIQUE:  ONE DAY protocol.  Stress injection: Date:01/16/24, 33.3 mCi of Myoview IV 20 seconds  after rapid injection of Lexiscan. Rest injection: Date: 01/16/24,  10.3 mCi of Myoview IV at rest. The  "patient had a rapid injection of  0.4 mg of Lexiscan IV over 10 seconds. Imaging was performed by  gated tomographic technique. Reason for Lexiscan: NEW PPM- PER MV      STRESS TEST DATA:  Resting heart rate was 63 BPM.  Resting blood pressure was 130/68 mmHg.  Peak blood pressure was 124/64 mmHg.  Peak heart rate was 77 BPM.      TEST TERMINATED DUE TO:  Protocol completed      FINDINGS:  STRESS TEST RESULTS:      Resting electrocardiogram revealed ectopic atrial rhythm, incomplete  right bundle branch block, poor R-wave progression. There were no  significant ischemic ECG changes or dysrhythmias. The patient did not  have chest pains/symptoms during procedure. There was a normal  recovery phase.      IMAGING RESULTS:      Image quality was good.  Rest and stress tomographic images were reviewed and revealed normal  perfusion without evidence of ischemia, myocardial infarction, or  left ventricular dilatation with stress. Overall left ventricular  systolic function appeared to be normal without regional wall motion  abnormalities. Ejection fraction was 64%.  TID is 1.02 and is normal.  There was not evidence of breast/motion/diaphragmatic attenuation  artifact.      IMPRESSION:  Normal Lexiscan Myoview cardiac perfusion stress test.  No evidence of ischemia or myocardial infarction by perfusion imaging.  Normal left ventricular systolic function, ejection fraction 64%.  Noninvasive risk stratification: Low risk.  Comparison study dated February 11, 2020 was negative for ischemia or  infarction. Calculated LV ejection fraction 66%.      Signed by: Javier Warren 1/16/2024 12:02 PM  Dictation workstation:   [id number]  EKG:   No results found for: \"EKG\"      Radiology:     No orders to display       Assessment/Plan:         Patient Active Problem List   Diagnosis    Asymptomatic carotid artery stenosis    Atherosclerosis of CABG, unsp, w unsp angina pectoris (CMS/HCC)    CAD (coronary artery disease)    Known medical " problems    Altered mental status    Cardiac pacemaker    Dyslipidemia    Erectile dysfunction    GERD (gastroesophageal reflux disease)    HTN (hypertension)    Syncope, near    Hyperlipidemia    Malignant essential hypertension    Overweight with body mass index (BMI) of 27 to 27.9 in adult    Syncope    Urinary incontinence    Vitamin D deficiency    Complete heart block (CMS/HCC)    Drug therapy    Never smoked cigarettes         ASSESSMENT       83-year-old gentleman here for cardiovascular follow-up and to review cardiovascular studies.    Meds, vitals, examination as noted.    Chart reviewed in detail discussed with the patient and his wife.    Impression:  ASHD class II  Remote coronary artery bypass surgery  Normal cardiac function studies with normal perfusion and normal LV function  Permanent pacemaker  Mild mitral and tricuspid regurgitation  Hypertension controlled  Dyslipidemia  Mild cognitive dysfunction  Non-smoker  PLAN         Recommendation:  Maintain current meds  See me in 6 months  Call if any issue problems arise  Follow-up with primary care and other physicians as planned  Usual pacemaker clinic and EP follow-up as well

## 2024-02-12 ENCOUNTER — HOSPITAL ENCOUNTER (OUTPATIENT)
Dept: CARDIOLOGY | Facility: HOSPITAL | Age: 84
Discharge: HOME | End: 2024-02-12
Payer: MEDICARE

## 2024-02-12 DIAGNOSIS — I44.1 ATRIOVENTRICULAR BLOCK, MOBITZ TYPE 2: ICD-10-CM

## 2024-02-12 DIAGNOSIS — Z95.0 PACEMAKER: ICD-10-CM

## 2024-02-12 DIAGNOSIS — Z95.0 PACEMAKER: Primary | ICD-10-CM

## 2024-02-12 PROCEDURE — 93294 REM INTERROG EVL PM/LDLS PM: CPT | Performed by: INTERNAL MEDICINE

## 2024-02-12 PROCEDURE — 93296 REM INTERROG EVL PM/IDS: CPT

## 2024-03-12 ENCOUNTER — TELEPHONE (OUTPATIENT)
Dept: PRIMARY CARE | Facility: CLINIC | Age: 84
End: 2024-03-12
Payer: MEDICARE

## 2024-03-12 DIAGNOSIS — I10 PRIMARY HYPERTENSION: Primary | ICD-10-CM

## 2024-03-12 RX ORDER — OLMESARTAN MEDOXOMIL 20 MG/1
20 TABLET ORAL DAILY
Qty: 90 TABLET | Refills: 1 | Status: SHIPPED | OUTPATIENT
Start: 2024-03-12

## 2024-03-28 ENCOUNTER — TELEPHONE (OUTPATIENT)
Dept: PRIMARY CARE | Facility: CLINIC | Age: 84
End: 2024-03-28
Payer: MEDICARE

## 2024-03-28 DIAGNOSIS — K21.9 GASTROESOPHAGEAL REFLUX DISEASE, UNSPECIFIED WHETHER ESOPHAGITIS PRESENT: Primary | ICD-10-CM

## 2024-03-29 RX ORDER — FAMOTIDINE 20 MG/1
20 TABLET, FILM COATED ORAL 2 TIMES DAILY
Qty: 180 TABLET | Refills: 3 | Status: SHIPPED | OUTPATIENT
Start: 2024-03-29

## 2024-04-16 ENCOUNTER — HOSPITAL ENCOUNTER (OUTPATIENT)
Dept: CARDIOLOGY | Facility: HOSPITAL | Age: 84
Discharge: HOME | End: 2024-04-16
Payer: MEDICARE

## 2024-04-16 ENCOUNTER — OFFICE VISIT (OUTPATIENT)
Dept: CARDIOLOGY | Facility: CLINIC | Age: 84
End: 2024-04-16
Payer: MEDICARE

## 2024-04-16 VITALS
DIASTOLIC BLOOD PRESSURE: 70 MMHG | SYSTOLIC BLOOD PRESSURE: 106 MMHG | BODY MASS INDEX: 27.62 KG/M2 | WEIGHT: 176 LBS | HEART RATE: 65 BPM | HEIGHT: 67 IN

## 2024-04-16 DIAGNOSIS — Z95.0 CARDIAC PACEMAKER: Primary | ICD-10-CM

## 2024-04-16 DIAGNOSIS — I25.709: ICD-10-CM

## 2024-04-16 DIAGNOSIS — E78.2 MIXED HYPERLIPIDEMIA: ICD-10-CM

## 2024-04-16 DIAGNOSIS — Z71.89 ENCOUNTER FOR MEDICATION REVIEW AND COUNSELING: ICD-10-CM

## 2024-04-16 DIAGNOSIS — Z78.9 NEVER SMOKED CIGARETTES: ICD-10-CM

## 2024-04-16 DIAGNOSIS — Z71.89 ENCOUNTER TO DISCUSS TREATMENT OPTIONS: ICD-10-CM

## 2024-04-16 DIAGNOSIS — I44.2 COMPLETE HEART BLOCK (MULTI): ICD-10-CM

## 2024-04-16 DIAGNOSIS — R55 SYNCOPE, UNSPECIFIED SYNCOPE TYPE: ICD-10-CM

## 2024-04-16 DIAGNOSIS — Z95.0 CARDIAC PACEMAKER: ICD-10-CM

## 2024-04-16 DIAGNOSIS — I10 MALIGNANT ESSENTIAL HYPERTENSION: ICD-10-CM

## 2024-04-16 PROCEDURE — 99214 OFFICE O/P EST MOD 30 MIN: CPT | Performed by: INTERNAL MEDICINE

## 2024-04-16 PROCEDURE — 1159F MED LIST DOCD IN RCRD: CPT | Performed by: INTERNAL MEDICINE

## 2024-04-16 PROCEDURE — 3078F DIAST BP <80 MM HG: CPT | Performed by: INTERNAL MEDICINE

## 2024-04-16 PROCEDURE — 93280 PM DEVICE PROGR EVAL DUAL: CPT | Performed by: INTERNAL MEDICINE

## 2024-04-16 PROCEDURE — 3074F SYST BP LT 130 MM HG: CPT | Performed by: INTERNAL MEDICINE

## 2024-04-16 PROCEDURE — 1036F TOBACCO NON-USER: CPT | Performed by: INTERNAL MEDICINE

## 2024-04-16 PROCEDURE — 93000 ELECTROCARDIOGRAM COMPLETE: CPT | Mod: DISTINCT PROCEDURAL SERVICE | Performed by: INTERNAL MEDICINE

## 2024-04-16 PROCEDURE — 93280 PM DEVICE PROGR EVAL DUAL: CPT

## 2024-04-16 ASSESSMENT — PATIENT HEALTH QUESTIONNAIRE - PHQ9
1. LITTLE INTEREST OR PLEASURE IN DOING THINGS: NOT AT ALL
SUM OF ALL RESPONSES TO PHQ9 QUESTIONS 1 AND 2: 0
2. FEELING DOWN, DEPRESSED OR HOPELESS: NOT AT ALL

## 2024-04-16 ASSESSMENT — ENCOUNTER SYMPTOMS
DEPRESSION: 0
DYSPNEA ON EXERTION: 0
PALPITATIONS: 0

## 2024-04-16 NOTE — PROGRESS NOTES
"Chief Complaint:   Pacemaker Check and Follow-up (6 month )     History Of Present Illness:    Jones Avila is a 83 y.o. male presenting with follow-up.  He is accompanied by his wife.    He has had no recurrent syncope after pacemaker implanted.    His device site healed well  Last Recorded Vitals:  Vitals:    04/16/24 0857   BP: 106/70   BP Location: Right arm   Patient Position: Sitting   BP Cuff Size: Adult   Pulse: 65   Weight: 79.8 kg (176 lb)   Height: 1.702 m (5' 7\")       Past Medical History:  See List     Past Surgical History:  See List       Social History:  He reports that he has never smoked. He has never used smokeless tobacco. He reports current alcohol use. He reports that he does not currently use drugs.    Family History:  Family History   Problem Relation Name Age of Onset    Asthma Mother      Other (Death of natural cause) Mother      Hypertension Father      Coronary artery disease Father      Other (myocardial infarction) Father      Stroke Other Grandparent         CVA        Allergies:  Patient has no known allergies.    Outpatient Medications:  Current Outpatient Medications   Medication Instructions    aspirin 81 mg EC tablet 1 tablet, oral, Daily    Crestor 10 mg tablet 1 tablet, oral, Daily    famotidine (PEPCID) 20 mg, oral, 2 times daily, if needed for BREAKTHROUGH HEARTBURN    metoprolol tartrate (Lopressor) 50 mg tablet 1 po qd    olmesartan (BENICAR) 20 mg, oral, Daily    Pfizer COVID Bival,12y up,,PF, 30 mcg/0.3 mL suspension vaccine     sildenafil (Viagra) 50 mg tablet 0.5-2 tablets, oral, 30 MINUTES prior to intercourse   Review of Systems   Cardiovascular:  Negative for chest pain, dyspnea on exertion and palpitations.   All other systems reviewed and are negative.        Physical Exam:  Constitutional:       General: Awake.      Appearance: Normal and healthy appearance. Well-developed and not in distress.   Neck:      Vascular: No JVR. JVD normal.   Pulmonary:      Effort: " Pulmonary effort is normal.      Breath sounds: Normal breath sounds. No wheezing. No rhonchi. No rales.   Chest:      Chest wall: Not tender to palpatation.      Comments: Left sided device pocket- healed and well approximated. No swelling or hematoma      Cardiovascular:      PMI at left midclavicular line. Normal rate. Regular rhythm. Normal S1. Normal S2.       Murmurs: There is no murmur.      No gallop.  No click. No rub.   Pulses:     Intact distal pulses.   Edema:     Peripheral edema absent.   Abdominal:      Tenderness: There is no abdominal tenderness.   Musculoskeletal: Normal range of motion.         General: No tenderness. Skin:     General: Skin is warm and dry.   Neurological:      General: No focal deficit present.      Mental Status: Alert and oriented to person, place and time.            Last Labs:  CBC -  Lab Results   Component Value Date    WBC 11.7 (H) 07/13/2023    HGB 13.5 07/13/2023    HCT 38.8 (L) 07/13/2023    MCV 93 07/13/2023     07/13/2023       CMP -  Lab Results   Component Value Date    CALCIUM 8.7 07/13/2023    PROT 6.6 07/09/2023    ALBUMIN 3.9 07/09/2023    AST 27 07/09/2023    ALT 20 07/09/2023    ALKPHOS 59 07/09/2023    BILITOT 0.5 07/09/2023       LIPID PANEL -   Lab Results   Component Value Date    CHOL 118 07/06/2022    TRIG 72 07/06/2022    HDL 48.0 07/06/2022    CHHDL 2.5 07/06/2022    LDLF 56 07/06/2022    VLDL 14 07/06/2022       RENAL FUNCTION PANEL -   Lab Results   Component Value Date    GLUCOSE 100 (H) 07/13/2023     07/13/2023    K 4.1 07/13/2023     07/13/2023    CO2 26 07/13/2023    ANIONGAP 12 07/13/2023    BUN 12 07/13/2023    CREATININE 0.87 07/13/2023    GFRMALE 87 07/13/2023    CALCIUM 8.7 07/13/2023    ALBUMIN 3.9 07/09/2023        Lab Results   Component Value Date    HGBA1C 5.2 07/06/2022       Last Cardiology Tests:  ECG:  Today.  Appropriate pacing.  Right axis deviation.  Corrected QT interval 440 ms.  MN interval 270 ms    Device  check.  Today.  [employer] W1 DR 01.  Utilizing epicardial LV lead from 2008 and new atrial lead in July 2023.  Device over 10 years.  50% atrial pacing.  78% ventricular pacing.  0 VT.  Less than 0.1% A-fib    Echo:  Transthoracic Echo (TTE) Complete 01/16/2024      Ejection Fractions:  EF   Date/Time Value Ref Range Status   01/16/2024 08:58 AM 62       Stress Test:  Nuclear Stress Test 01/16/2024  LVEF 64%.  No ischemia.  No infarct.    Lab review: I have personally reviewed the laboratory result(s) see above    Assessment/Plan   Diagnoses and all orders for this visit:  Cardiac pacemaker  -     Follow Up In Cardiology  Complete heart block (Multi)  -     Follow Up In Cardiology  Atherosclerosis of CABG, unsp, w unsp angina pectoris (CMS-HCC)  Mixed hyperlipidemia  Malignant essential hypertension  Syncope, unspecified syncope type  Never smoked cigarettes  Encounter for medication review and counseling  Encounter to discuss treatment options  BMI 27.0-27.9,adult  Christina Padgett RN    Complete heart block associated with syncope.  Status post pacemaker 2023.  His pacer system uses epicardial LV lead from 2008 and atrial lead implanted July 2023.  Medtronic W1 DR 01 pacemaker.  Reviewed device check.  Normal function.  Discussed standard of care for device follow-up with patient and wife.  Ordered device checks.  All questions answered.  Coronary artery disease.  Status post remote bypass surgery 2008 x 5 vessels.  S/p left appendage ligation at that time.  Stress test 2024 with LVEF 64%.  No ischemia and no infarct.  Hypertension.  Chronic.  Stable.  Reviewed meds.  Continue meds.  Discussed refills  Valvular heart disease with mild to moderate MR, moderate left atrial enlargement, and LVEF 60% by echocardiogram 2023  Overweight  American Heart Association recommendations regarding exercise and diet discussed  Counseling greater than 50% of visit for discussion of normal rhythm, complete heart block, syncope,  pacemaker, device follow-up, ECG, standard care for device follow-up, occasions, what medications we need refills, indications for medications, treatment options, risk, benefits, and imponderables.  All questions answered.  Patient and family appreciative of care

## 2024-04-16 NOTE — PATIENT INSTRUCTIONS
Continue same medications/treatment.  Patient educated on proper medication use.  Patient educated on risk factor modification.  Please bring any lab results from other providers/physicians to your next appointment.    Please bring all medicines, vitamins, and herbal supplements with you when you come to the office.    Prescriptions will not be filled unless you are compliant with your follow up appointments or have a follow up appointment scheduled as per instruction of your physician. Refills should be requested at the time of your visit.    Follow up with Libby in 6 months with device check  Continue remote checks at 3 and 9 months    DOMINIC THOMAS RN, AM SCRIBING FOR AND IN THE PRESENCE OF DR. JERILYN VEGA MD, FACC, FACP, RS

## 2024-06-13 ENCOUNTER — OFFICE VISIT (OUTPATIENT)
Dept: PRIMARY CARE | Facility: CLINIC | Age: 84
End: 2024-06-13
Payer: MEDICARE

## 2024-06-13 VITALS
BODY MASS INDEX: 27.31 KG/M2 | DIASTOLIC BLOOD PRESSURE: 68 MMHG | WEIGHT: 174 LBS | SYSTOLIC BLOOD PRESSURE: 106 MMHG | HEIGHT: 67 IN | HEART RATE: 75 BPM | OXYGEN SATURATION: 97 %

## 2024-06-13 DIAGNOSIS — L98.9 SKIN LESIONS: Primary | ICD-10-CM

## 2024-06-13 DIAGNOSIS — L82.1 SEBORRHEIC KERATOSES: ICD-10-CM

## 2024-06-13 PROCEDURE — 1159F MED LIST DOCD IN RCRD: CPT | Performed by: FAMILY MEDICINE

## 2024-06-13 PROCEDURE — 99213 OFFICE O/P EST LOW 20 MIN: CPT | Performed by: FAMILY MEDICINE

## 2024-06-13 PROCEDURE — 3074F SYST BP LT 130 MM HG: CPT | Performed by: FAMILY MEDICINE

## 2024-06-13 PROCEDURE — 3078F DIAST BP <80 MM HG: CPT | Performed by: FAMILY MEDICINE

## 2024-06-13 PROCEDURE — 1036F TOBACCO NON-USER: CPT | Performed by: FAMILY MEDICINE

## 2024-06-13 NOTE — PATIENT INSTRUCTIONS
Seborrheic keratoses, various skin lesions, some might be actinic keratoses or other precancerous lesions.  Some have rather variable pigmentation. Patient would like to find dermatologist somewhere near -->> we discussed referring to dermatology.  Patient would like to find someone close to [place].  Not finding anybody in our system.  Could try calling [facility] at [phone], their address is [address] [place] [address].  Their website is [email or web address].  Call us if you need a referral anywhere and will me know where to send it and we will.        Follow-up as previously scheduled.

## 2024-06-13 NOTE — PROGRESS NOTES
"Subjective   Patient ID: Jones Avila is a 83 y.o. male who presents for Mole / Skin Check (Pt in today for mole / skin check).    Review of Systems  Denies N/V/D/C, no HA/S/V, denies rashes/lesions, no CP/SOB. Denies fevers/chills.  All other systems were negative.     Objective   /68 (BP Location: Left arm, Patient Position: Sitting)   Pulse 75   Ht 1.702 m (5' 7\")   Wt 78.9 kg (174 lb)   SpO2 97%   BMI 27.25 kg/m²     Physical Exam    derm: Several lesions on bilateral upper extremities, some with variable pigmentation, some with flaky dry area/patch, consistent with SKs or AK's.   neuro: CN II-XII grossly intact  psych: A&Ox3    Assessment/Plan   Problem List Items Addressed This Visit    None  Visit Diagnoses         Codes    Skin lesions    -  Primary L98.9    Seborrheic keratoses     L82.1          Patient seen today with his wife, Vicki.    Seborrheic keratoses, various skin lesions, some might be actinic keratoses or other precancerous lesions.  Some have rather variable pigmentation. Patient would like to find dermatologist somewhere near -->> we discussed referring to dermatology.  Patient would like to find someone close to [place].  Not finding anybody in our system.  Could try calling [facility] at [phone], their address is [address] [place], [place] [address], [place] [address].  Their website is [email or web address].  Call us if you need a referral anywhere and will me know where to send it and we will.        Follow-up as previously scheduled.  "

## 2024-07-02 ENCOUNTER — APPOINTMENT (OUTPATIENT)
Dept: PRIMARY CARE | Facility: CLINIC | Age: 84
End: 2024-07-02
Payer: MEDICARE

## 2024-07-02 DIAGNOSIS — Z13.9 SCREENING FOR CONDITION: ICD-10-CM

## 2024-07-02 DIAGNOSIS — Z12.5 SCREENING FOR PROSTATE CANCER: ICD-10-CM

## 2024-07-02 DIAGNOSIS — Z00.00 PREVENTATIVE HEALTH CARE: ICD-10-CM

## 2024-07-02 DIAGNOSIS — E55.9 VITAMIN D DEFICIENCY: ICD-10-CM

## 2024-07-02 DIAGNOSIS — Z79.899 DRUG THERAPY: ICD-10-CM

## 2024-07-02 LAB
25(OH)D3 SERPL-MCNC: 47 NG/ML (ref 30–100)
ALBUMIN SERPL BCP-MCNC: 4.2 G/DL (ref 3.4–5)
ALP SERPL-CCNC: 60 U/L (ref 33–136)
ALT SERPL W P-5'-P-CCNC: 19 U/L (ref 10–52)
ANION GAP SERPL CALC-SCNC: 11 MMOL/L (ref 10–20)
APPEARANCE UR: CLEAR
AST SERPL W P-5'-P-CCNC: 29 U/L (ref 9–39)
BASOPHILS # BLD AUTO: 0.08 X10*3/UL (ref 0–0.1)
BASOPHILS NFR BLD AUTO: 1 %
BILIRUB SERPL-MCNC: 0.8 MG/DL (ref 0–1.2)
BILIRUB UR STRIP.AUTO-MCNC: NEGATIVE MG/DL
BUN SERPL-MCNC: 16 MG/DL (ref 6–23)
CALCIUM SERPL-MCNC: 9 MG/DL (ref 8.6–10.3)
CHLORIDE SERPL-SCNC: 103 MMOL/L (ref 98–107)
CHOLEST SERPL-MCNC: 195 MG/DL (ref 0–199)
CHOLESTEROL/HDL RATIO: 4.1
CO2 SERPL-SCNC: 29 MMOL/L (ref 21–32)
COLOR UR: NORMAL
CREAT SERPL-MCNC: 1.12 MG/DL (ref 0.5–1.3)
EGFRCR SERPLBLD CKD-EPI 2021: 66 ML/MIN/1.73M*2
EOSINOPHIL # BLD AUTO: 0.45 X10*3/UL (ref 0–0.4)
EOSINOPHIL NFR BLD AUTO: 5.4 %
ERYTHROCYTE [DISTWIDTH] IN BLOOD BY AUTOMATED COUNT: 13.2 % (ref 11.5–14.5)
GLUCOSE SERPL-MCNC: 89 MG/DL (ref 74–99)
GLUCOSE UR STRIP.AUTO-MCNC: NORMAL MG/DL
HCT VFR BLD AUTO: 43.2 % (ref 41–52)
HDLC SERPL-MCNC: 47.4 MG/DL
HGB BLD-MCNC: 14.4 G/DL (ref 13.5–17.5)
IMM GRANULOCYTES # BLD AUTO: 0.03 X10*3/UL (ref 0–0.5)
IMM GRANULOCYTES NFR BLD AUTO: 0.4 % (ref 0–0.9)
KETONES UR STRIP.AUTO-MCNC: NEGATIVE MG/DL
LDLC SERPL CALC-MCNC: 125 MG/DL
LEUKOCYTE ESTERASE UR QL STRIP.AUTO: NEGATIVE
LYMPHOCYTES # BLD AUTO: 2.22 X10*3/UL (ref 0.8–3)
LYMPHOCYTES NFR BLD AUTO: 26.9 %
MAGNESIUM SERPL-MCNC: 2.13 MG/DL (ref 1.6–2.4)
MCH RBC QN AUTO: 33 PG (ref 26–34)
MCHC RBC AUTO-ENTMCNC: 33.3 G/DL (ref 32–36)
MCV RBC AUTO: 99 FL (ref 80–100)
MONOCYTES # BLD AUTO: 0.98 X10*3/UL (ref 0.05–0.8)
MONOCYTES NFR BLD AUTO: 11.9 %
NEUTROPHILS # BLD AUTO: 4.5 X10*3/UL (ref 1.6–5.5)
NEUTROPHILS NFR BLD AUTO: 54.4 %
NITRITE UR QL STRIP.AUTO: NEGATIVE
NON HDL CHOLESTEROL: 148 MG/DL (ref 0–149)
NRBC BLD-RTO: 0 /100 WBCS (ref 0–0)
PH UR STRIP.AUTO: 6 [PH]
PLATELET # BLD AUTO: 171 X10*3/UL (ref 150–450)
POTASSIUM SERPL-SCNC: 4.3 MMOL/L (ref 3.5–5.3)
PROT SERPL-MCNC: 6.4 G/DL (ref 6.4–8.2)
PROT UR STRIP.AUTO-MCNC: NEGATIVE MG/DL
PSA SERPL-MCNC: 0.73 NG/ML
RBC # BLD AUTO: 4.36 X10*6/UL (ref 4.5–5.9)
RBC # UR STRIP.AUTO: NEGATIVE /UL
SODIUM SERPL-SCNC: 139 MMOL/L (ref 136–145)
SP GR UR STRIP.AUTO: 1.01
TRIGL SERPL-MCNC: 114 MG/DL (ref 0–149)
TSH SERPL-ACNC: 1.87 MIU/L (ref 0.44–3.98)
UROBILINOGEN UR STRIP.AUTO-MCNC: NORMAL MG/DL
VLDL: 23 MG/DL (ref 0–40)
WBC # BLD AUTO: 8.3 X10*3/UL (ref 4.4–11.3)

## 2024-07-02 PROCEDURE — 83036 HEMOGLOBIN GLYCOSYLATED A1C: CPT

## 2024-07-02 PROCEDURE — 85025 COMPLETE CBC W/AUTO DIFF WBC: CPT

## 2024-07-02 PROCEDURE — 80053 COMPREHEN METABOLIC PANEL: CPT

## 2024-07-02 PROCEDURE — 80061 LIPID PANEL: CPT

## 2024-07-02 PROCEDURE — 84443 ASSAY THYROID STIM HORMONE: CPT

## 2024-07-02 PROCEDURE — 83735 ASSAY OF MAGNESIUM: CPT

## 2024-07-02 PROCEDURE — 36415 COLL VENOUS BLD VENIPUNCTURE: CPT

## 2024-07-02 PROCEDURE — G0103 PSA SCREENING: HCPCS

## 2024-07-02 PROCEDURE — 82306 VITAMIN D 25 HYDROXY: CPT

## 2024-07-02 PROCEDURE — 81003 URINALYSIS AUTO W/O SCOPE: CPT

## 2024-07-02 NOTE — PROGRESS NOTES
Subjective   Patient ID: Jones Avila is a 83 y.o. male who presents for Labs Only (Pt in today for lab draw).    HPI     Review of Systems    Objective   There were no vitals taken for this visit.    Physical Exam    Assessment/Plan

## 2024-07-03 LAB
EST. AVERAGE GLUCOSE BLD GHB EST-MCNC: 103 MG/DL
HBA1C MFR BLD: 5.2 %

## 2024-07-05 ENCOUNTER — APPOINTMENT (OUTPATIENT)
Dept: PRIMARY CARE | Facility: CLINIC | Age: 84
End: 2024-07-05
Payer: MEDICARE

## 2024-07-12 ENCOUNTER — APPOINTMENT (OUTPATIENT)
Dept: PRIMARY CARE | Facility: CLINIC | Age: 84
End: 2024-07-12
Payer: MEDICARE

## 2024-07-12 VITALS
HEIGHT: 67 IN | SYSTOLIC BLOOD PRESSURE: 120 MMHG | WEIGHT: 171 LBS | BODY MASS INDEX: 26.84 KG/M2 | OXYGEN SATURATION: 97 % | HEART RATE: 74 BPM | DIASTOLIC BLOOD PRESSURE: 66 MMHG

## 2024-07-12 DIAGNOSIS — E55.9 VITAMIN D DEFICIENCY: ICD-10-CM

## 2024-07-12 DIAGNOSIS — E78.2 MIXED HYPERLIPIDEMIA: ICD-10-CM

## 2024-07-12 DIAGNOSIS — Z79.899 DRUG THERAPY: ICD-10-CM

## 2024-07-12 DIAGNOSIS — I25.10 CORONARY ARTERY DISEASE INVOLVING NATIVE CORONARY ARTERY OF NATIVE HEART WITHOUT ANGINA PECTORIS: ICD-10-CM

## 2024-07-12 DIAGNOSIS — Z00.00 ROUTINE GENERAL MEDICAL EXAMINATION AT HEALTH CARE FACILITY: Primary | ICD-10-CM

## 2024-07-12 DIAGNOSIS — L82.1 SEBORRHEIC KERATOSES: ICD-10-CM

## 2024-07-12 DIAGNOSIS — Z12.5 SCREENING FOR PROSTATE CANCER: ICD-10-CM

## 2024-07-12 DIAGNOSIS — Z13.9 SCREENING FOR CONDITION: ICD-10-CM

## 2024-07-12 DIAGNOSIS — I25.709: ICD-10-CM

## 2024-07-12 DIAGNOSIS — Z00.00 PREVENTATIVE HEALTH CARE: ICD-10-CM

## 2024-07-12 PROCEDURE — G0439 PPPS, SUBSEQ VISIT: HCPCS | Performed by: FAMILY MEDICINE

## 2024-07-12 PROCEDURE — 99214 OFFICE O/P EST MOD 30 MIN: CPT | Performed by: FAMILY MEDICINE

## 2024-07-12 PROCEDURE — 1159F MED LIST DOCD IN RCRD: CPT | Performed by: FAMILY MEDICINE

## 2024-07-12 PROCEDURE — 1170F FXNL STATUS ASSESSED: CPT | Performed by: FAMILY MEDICINE

## 2024-07-12 PROCEDURE — 3078F DIAST BP <80 MM HG: CPT | Performed by: FAMILY MEDICINE

## 2024-07-12 PROCEDURE — 1036F TOBACCO NON-USER: CPT | Performed by: FAMILY MEDICINE

## 2024-07-12 PROCEDURE — 3074F SYST BP LT 130 MM HG: CPT | Performed by: FAMILY MEDICINE

## 2024-07-12 PROCEDURE — 99397 PER PM REEVAL EST PAT 65+ YR: CPT | Performed by: FAMILY MEDICINE

## 2024-07-12 ASSESSMENT — ENCOUNTER SYMPTOMS
OCCASIONAL FEELINGS OF UNSTEADINESS: 0
LOSS OF SENSATION IN FEET: 0
DEPRESSION: 0

## 2024-07-12 ASSESSMENT — ACTIVITIES OF DAILY LIVING (ADL)
BATHING: INDEPENDENT
MANAGING_FINANCES: INDEPENDENT
TAKING_MEDICATION: INDEPENDENT
DOING_HOUSEWORK: INDEPENDENT
DRESSING: INDEPENDENT
GROCERY_SHOPPING: INDEPENDENT

## 2024-07-12 NOTE — PROGRESS NOTES
0Subjective   Patient ID: Jones Avila is a 83 y.o. male who presents for MCW / Lab REview (Pt in today for his Medicare wellness / lab review FU).    Review of Systems  Denies N/V/D/C, no HA/S/V, denies rashes/lesions, no CP/SOB. Denies fevers/chills.  Positive for raised hyperpigmented patch on back.  All other systems were negative.    Objective   Physical Exam  Gen: NAD  eyes: EOMI, PERRLA  ENT: hearing grossly intact, no nasal discharge  resp: CTABL, without R/R  heart: RRR without MRG  GI: abd: S/ND/NT, BS+  lymph: no axillary, cervical, supraclavicular lymphadenopathy noted   MS: gait grossly WNL,  derm: Possible 1.5 cm hyperpigmented raised slightly scaly patch on right side of back around T10.  neuro: CN II-XII grossly intact  psych: A&Ox3    Assessment/Plan   Diagnoses and all orders for this visit:  Preventative health care  -     CBC and Auto Differential; Future  -     Comprehensive Metabolic Panel; Future  -     TSH with reflex to Free T4 if abnormal; Future  -     Magnesium; Future  -     Lipid Panel; Future  -     Hemoglobin A1C; Future  -     Urinalysis with Reflex Microscopic; Future  -     Vitamin D 25-Hydroxy,Total (for eval of Vitamin D levels); Future  -     Prostate Spec.Ag,Screen; Future  Drug therapy  -     CBC and Auto Differential; Future  -     Comprehensive Metabolic Panel; Future  -     Magnesium; Future  -     Urinalysis with Reflex Microscopic; Future  Screening for condition  -     TSH with reflex to Free T4 if abnormal; Future  -     Lipid Panel; Future  -     Hemoglobin A1C; Future  Vitamin D deficiency  -     Vitamin D 25-Hydroxy,Total (for eval of Vitamin D levels); Future  Screening for prostate cancer  -     Prostate Spec.Ag,Screen; Future  Atherosclerosis of CABG, unsp, w unsp angina pectoris (CMS-HCC)  Coronary artery disease involving native coronary artery of native heart without angina pectoris  Seborrheic keratoses  Mixed hyperlipidemia  -     Lipid Panel;  Future        Toi Anderson, DO 07/12/24 10:48 AM     Doing annual preventative and Medicare wellness today including annual lab review:    ----    Screening for prostate cancer.  PSA was 0.73, totally normal.  Will check annually.    Immunization counseling: Up-to-date on all immunizations except is due for the second Shingrix Shingrix shot.  2021 was last Tdap.  -->>  Continue to follow-up with your pharmacy and keep up-to-date on immunizations.      -----      Seborrheic keratosis, -->> again recommend you follow-up with dermatology.      New annual labs reviewed:    - Hyperlipidemia, on Crestor/rosuvastatin, prescribed by cardiology.  Follow-up as planned.      - h/o mild anemia, resolved, vitamin B-12 deficiency, 178, was 202, goal is 400 or more. Patient not taking any B12. Feels he has excellent energy levels. -->> Since patient is not having any problems with this and is no longer anemic, we do not need to recheck B12. If you find you could use more energy you might try B12 1000 mcg daily just to see if it is of any benefit.    - Vitamin D deficiency. 47, was 51, 55, 49, 42, 43, 31 before that. Goal is . Is on vitamin D, not sure of the dose. Hav him ask foroe asked for dose info a couple times.  -->>  Double your dose of vitamin D what ever it is and will recheck with next annual labs.       - Drug therapy, screening for condition. A1c is 5.2, was 5.23, 5.5, so no diabetes. -->> will repeat annual labs around jul 2023       Coronary artery disease, bundle branch block, had syncopal episode summer 2023, now has pacemaker.  Doing well.  Follow-up with cardiology/Dr. Menjivar and Dr. Aguiar.      - Will schedule Medicare wellness visit with annual preventative and annual lab review in about 12 months.  - Patient will come in about a week before the appointment to get fasting annual labs including a PSA's screening.

## 2024-07-12 NOTE — PATIENT INSTRUCTIONS
Doing annual preventative and Medicare wellness today including annual lab review:    ----    Screening for prostate cancer.  PSA was 0.73, totally normal.  Will check annually.    Immunization counseling: Up-to-date on all immunizations except is due for the second Shingrix Shingrix shot.  2021 was last Tdap.  -->>  Continue to follow-up with your pharmacy and keep up-to-date on immunizations.      -----      Seborrheic keratosis, -->> again recommend you follow-up with dermatology.      New annual labs reviewed:    - Hyperlipidemia, on Crestor/rosuvastatin, prescribed by cardiology.  Follow-up as planned.      - h/o mild anemia, resolved, vitamin B-12 deficiency, 178, was 202, goal is 400 or more. Patient not taking any B12. Feels he has excellent energy levels. -->> Since patient is not having any problems with this and is no longer anemic, we do not need to recheck B12. If you find you could use more energy you might try B12 1000 mcg daily just to see if it is of any benefit.    - Vitamin D deficiency. 47, was 51, 55, 49, 42, 43, 31 before that. Goal is . Is on vitamin D, not sure of the dose. Hav him ask foroe asked for dose info a couple times.  -->>  Double your dose of vitamin D what ever it is and will recheck with next annual labs.       - Drug therapy, screening for condition. A1c is 5.2, was 5.23, 5.5, so no diabetes. -->> will repeat annual labs around jul 2023       Coronary artery disease, bundle branch block, had syncopal episode summer 2023, now has pacemaker.  Doing well.  Follow-up with cardiology/Dr. Menjivar and Dr. Aguiar.      - Will schedule Medicare wellness visit with annual preventative and annual lab review in about 12 months.  - Patient will come in about a week before the appointment to get fasting annual labs including a PSA's screening.

## 2024-07-24 ENCOUNTER — HOSPITAL ENCOUNTER (OUTPATIENT)
Dept: CARDIOLOGY | Facility: HOSPITAL | Age: 84
Discharge: HOME | End: 2024-07-24
Payer: MEDICARE

## 2024-07-24 DIAGNOSIS — Z95.0 PACEMAKER: ICD-10-CM

## 2024-07-24 DIAGNOSIS — I44.1 ATRIOVENTRICULAR BLOCK, MOBITZ TYPE 2: ICD-10-CM

## 2024-07-24 PROCEDURE — 93296 REM INTERROG EVL PM/IDS: CPT

## 2024-07-30 ENCOUNTER — APPOINTMENT (OUTPATIENT)
Dept: CARDIOLOGY | Facility: CLINIC | Age: 84
End: 2024-07-30
Payer: MEDICARE

## 2024-07-30 VITALS
BODY MASS INDEX: 27.15 KG/M2 | DIASTOLIC BLOOD PRESSURE: 60 MMHG | WEIGHT: 173 LBS | SYSTOLIC BLOOD PRESSURE: 110 MMHG | HEIGHT: 67 IN | HEART RATE: 72 BPM

## 2024-07-30 DIAGNOSIS — E78.2 MIXED HYPERLIPIDEMIA: ICD-10-CM

## 2024-07-30 DIAGNOSIS — Z78.9 NEVER SMOKED CIGARETTES: ICD-10-CM

## 2024-07-30 DIAGNOSIS — I44.2 COMPLETE HEART BLOCK (MULTI): ICD-10-CM

## 2024-07-30 DIAGNOSIS — I25.10 CORONARY ARTERY DISEASE INVOLVING NATIVE CORONARY ARTERY OF NATIVE HEART WITHOUT ANGINA PECTORIS: ICD-10-CM

## 2024-07-30 DIAGNOSIS — I10 PRIMARY HYPERTENSION: ICD-10-CM

## 2024-07-30 PROCEDURE — 99214 OFFICE O/P EST MOD 30 MIN: CPT | Performed by: INTERNAL MEDICINE

## 2024-07-30 PROCEDURE — 3078F DIAST BP <80 MM HG: CPT | Performed by: INTERNAL MEDICINE

## 2024-07-30 PROCEDURE — 1036F TOBACCO NON-USER: CPT | Performed by: INTERNAL MEDICINE

## 2024-07-30 PROCEDURE — 3074F SYST BP LT 130 MM HG: CPT | Performed by: INTERNAL MEDICINE

## 2024-07-30 PROCEDURE — 1159F MED LIST DOCD IN RCRD: CPT | Performed by: INTERNAL MEDICINE

## 2024-07-30 RX ORDER — ROSUVASTATIN CALCIUM 20 MG/1
20 TABLET, COATED ORAL DAILY
Qty: 90 TABLET | Refills: 3 | Status: SHIPPED | OUTPATIENT
Start: 2024-07-30 | End: 2025-07-30

## 2024-07-30 NOTE — PATIENT INSTRUCTIONS
Increase Crestor to 20 a day  6 month visit  Patient educated on proper medication use.   Patient educated on risk factor modification.   Please bring any lab results from other providers / physicians to your next appointment.     Please bring all medicines, vitamins, and herbal supplements with you when you come to the office.     Prescriptions will not be filled unless you are compliant with your follow up appointments or have a follow up appointment scheduled as per instruction of your physician. Refills should be requested at the time of your visit.

## 2024-07-30 NOTE — PROGRESS NOTES
Patient:  Jones Avila  YOB: 1940  MRN: [id number]       HPI:       Jones Avila is a 83 y.o. male who returns today for cardiac follow-up.  He has chronic coronary heart disease and prior coronary revascularization via bypass surgery.  Patient also has a pacemaker.  He is dyslipidemic as well.  Had lipid studies done by his primary care doctor which showed LDL still high at 125.  Recommend double his Crestor to 20 daily.  He has no chest pain or shortness of breath.  He had an noninvasive cardiac studies done earlier this year which were stable.  He is ambulatory without any difficulties.  Vitals are stable.  Exam is unchanged.      Objective:     Vitals:    07/30/24 1214   BP: 110/60   Pulse: 72       Wt Readings from Last 4 Encounters:   07/30/24 78.5 kg (173 lb)   07/12/24 77.6 kg (171 lb)   06/13/24 78.9 kg (174 lb)   04/16/24 79.8 kg (176 lb)       Allergies:     No Known Allergies     Medications:     Current Outpatient Medications   Medication Instructions    aspirin 81 mg EC tablet 1 tablet, oral, Daily    Crestor 10 mg tablet 1 tablet, oral, Daily    famotidine (PEPCID) 20 mg, oral, 2 times daily, if needed for BREAKTHROUGH HEARTBURN    metoprolol tartrate (Lopressor) 50 mg tablet 1 po qd    olmesartan (BENICAR) 20 mg, oral, Daily    sildenafil (Viagra) 50 mg tablet 0.5-2 tablets, oral, 30 MINUTES prior to intercourse       Physical Examination:     Constitutional:       Appearance: Healthy appearance.   Eyes:      Pupils: Pupils are equal, round, and reactive to light.   Pulmonary:      Effort: Pulmonary effort is normal.      Breath sounds: Normal breath sounds.   Cardiovascular:      PMI at left midclavicular line. Normal rate. Regular rhythm.      Murmurs: There is no murmur.      No gallop.  No click. No rub.   Pulses:     Intact distal pulses.   Musculoskeletal: Normal range of motion.      Cervical back: Normal range of motion. Skin:     General: Skin is warm and dry.   Neurological:     "  General: No focal deficit present.      Mental Status: Alert and oriented to person, place and time.          Lab:     CBC:   Lab Results   Component Value Date    WBC 8.3 07/02/2024    RBC 4.36 (L) 07/02/2024    HGB 14.4 07/02/2024    HCT 43.2 07/02/2024     07/02/2024        CMP:    Lab Results   Component Value Date     07/02/2024    K 4.3 07/02/2024     07/02/2024    CO2 29 07/02/2024    BUN 16 07/02/2024    CREATININE 1.12 07/02/2024    GLUCOSE 89 07/02/2024    CALCIUM 9.0 07/02/2024       Magnesium:    Lab Results   Component Value Date    MG 2.13 07/02/2024       Lipid Profile:    Lab Results   Component Value Date    TRIG 114 07/02/2024    HDL 47.4 07/02/2024    LDLCALC 125 (H) 07/02/2024       TSH:    Lab Results   Component Value Date    TSH 1.87 07/02/2024       BNP:   No results found for: \"BNP\"     PT/INR:    Lab Results   Component Value Date    PROTIME 13.3 (H) 07/09/2023    INR 1.2 (H) 07/09/2023       HgBA1c:    Lab Results   Component Value Date    HGBA1C 5.2 07/02/2024       BMP:  Lab Results   Component Value Date     07/02/2024     07/13/2023     07/12/2023     07/11/2023    K 4.3 07/02/2024    K 4.1 07/13/2023    K 4.0 07/12/2023    K 3.8 07/11/2023     07/02/2024     07/13/2023     07/12/2023     07/11/2023    CO2 29 07/02/2024    CO2 26 07/13/2023    CO2 27 07/12/2023    CO2 27 07/11/2023    BUN 16 07/02/2024    BUN 12 07/13/2023    BUN 16 07/12/2023    BUN 14 07/11/2023    CREATININE 1.12 07/02/2024    CREATININE 0.87 07/13/2023    CREATININE 0.98 07/12/2023    CREATININE 1.01 07/11/2023       CBC:  Lab Results   Component Value Date    WBC 8.3 07/02/2024    WBC 11.7 (H) 07/13/2023    WBC 10.9 07/12/2023    WBC 9.9 07/11/2023    RBC 4.36 (L) 07/02/2024    RBC 4.15 (L) 07/13/2023    RBC 4.36 (L) 07/12/2023    RBC 4.19 (L) 07/11/2023    HGB 14.4 07/02/2024    HGB 13.5 07/13/2023    HGB 14.1 07/12/2023    HGB 13.7 07/11/2023 "    HCT 43.2 07/02/2024    HCT 38.8 (L) 07/13/2023    HCT 41.4 07/12/2023    HCT 39.7 (L) 07/11/2023    MCV 99 07/02/2024    MCV 93 07/13/2023    MCV 95 07/12/2023    MCV 95 07/11/2023    MCH 33.0 07/02/2024    MCHC 33.3 07/02/2024    MCHC 34.8 07/13/2023    MCHC 34.1 07/12/2023    MCHC 34.5 07/11/2023    RDW 13.2 07/02/2024    RDW 12.8 07/13/2023    RDW 12.9 07/12/2023    RDW 12.9 07/11/2023     07/02/2024     07/13/2023     07/12/2023     07/11/2023       Cardiac Enzymes:    Lab Results   Component Value Date    TROPHS 10 07/09/2023    TROPHS 10 07/09/2023       Hepatic Function Panel:    Lab Results   Component Value Date    ALKPHOS 60 07/02/2024    ALT 19 07/02/2024    AST 29 07/02/2024    PROT 6.4 07/02/2024    BILITOT 0.8 07/02/2024    BILIDIR 0.2 07/09/2023       Diagnostic Studies:        [address], [address] [address]           Tel [phone] Fax [phone]     TRANSTHORACIC ECHOCARDIOGRAM REPORT        Patient Name:      LOKESH Mcneill Physician:    [id number] Javier Warren MD, [facility]  Study Date:        1/16/2024            Ordering Provider:    [id number] CARLOS EM  MRN/PID:           [id number]             Fellow:  Accession#:        [id number]         Nurse:  Date of Birth/Age: 1940 / 83 years Sonographer:          Carlos Jain  Gender:            M                    Additional Staff:  Height:            170.18 cm            Admit Date:           1/16/2024  Weight:            81.19 kg             Admission Status:     Outpatient  BSA:               1.93 m2              Department Location:  [facility]  Blood Pressure: 115 /60 mmHg     Study Type:    TRANSTHORACIC ECHO (TTE)  COMPLETE  Diagnosis/ICD: Atherosclerotic heart disease of native coronary artery without                 angina pectoris-I25.10; Atherosclerosis of coronary artery bypass                 graft(s), unspecified, with unspecified angina pectoris-I25.709  Indication:    CAD, Overweight, Atherosclerosis of CABG w/ angina  CPT Codes:     Echo Complete w Full [id number]     Patient History:  Pacer/Defib:       Permanent pacemaker  Pertinent History: CAD, HTN, Hyperlipidemia, Syncope and Overweight,                     Atherosclerosis of CABG w/ angina, CHB.     Study Detail: The following Echo studies were performed: 2D, M-Mode, Doppler and                color flow. The patient was awake.        PHYSICIAN INTERPRETATION:  Left Ventricle: Left ventricular systolic function is normal, with an estimated ejection fraction of 60-65%. There are no regional wall motion abnormalities. The left ventricular cavity size is normal. The left ventricular septal wall thickness is normal. There is normal left ventricular posterior wall thickness. Spectral Doppler shows an impaired relaxation pattern of left ventricular diastolic filling.  Left Atrium: The left atrium is normal in size. Left atrial volume index 24.4 mL/m2.  Right Ventricle: The right ventricle is normal in size. There is normal right ventricular global systolic function. Normal right ventricular chamber size and function.  Right Atrium: The right atrium is normal in size.  Aortic Valve: The aortic valve is trileaflet. There is mild aortic valve cusp calcification. There is no evidence of aortic valve regurgitation. The peak instantaneous gradient of the aortic valve is 8.5 mmHg. The mean gradient of the aortic valve is 5.0 mmHg.  Mitral Valve: The mitral valve is normal in structure. There is moderate to severe calcification of the posterior mitral valve leaflet. There is moderate mitral annular calcification. There is mild mitral valve regurgitation. Mild (1+) mitral  regurgitation.  Tricuspid Valve: The tricuspid valve is structurally normal. There is mild tricuspid regurgitation. Mild (1+) tricuspid regurgitation with estimated RVSP of 27 mmHg.  Pulmonic Valve: The pulmonic valve is structurally normal. There is mild pulmonic valve regurgitation.  Pericardium: There is no pericardial effusion noted.  Aorta: The aortic root is normal.  Systemic Veins: The inferior vena cava appears to be of normal size. There is IVC inspiratory collapse greater than 50%.  Additional Comments: Comparison study dated July 10, 2023 showed estimated LV ejection fraction 60 to 65%. 2+ mitral regurgitation and 1+ tricuspid regurgitation. Moderate to severe thickening of the posterior mitral valve leaflet.        CONCLUSIONS:   1. Left ventricular systolic function is normal with a 60-65% estimated ejection fraction.   2. Spectral Doppler shows an impaired relaxation pattern of left ventricular diastolic filling.   3. Normal right ventricular chamber size and function.   4. Mild (1+) mitral regurgitation.   5. There is moderate mitral annular calcification.   6. There is moderate to severe calcification of the posterior mitral valve leaflet.   7. Mild (1+) tricuspid regurgitation with estimated RVSP of 27 mmHg.        Interpreted By:  Javier Warren and Arthur Skyler   STUDY:  MYOCARDIAL PERFUSION STRESS TEST WITH LEXISCAN      Performing facility:  [facility],  [address] [place] [address] [address],  [address] Provider:  Robert Menjivar DO, [facility]  PCP:  Dr. JESSICA MARTINEZ  Supervising provider:  Tara Batres MD, [facility]      INDICATION:  CAD W/O Angina  HTN  Atherosclerosis Of CABG      HISTORY:  Gender:  M; Age:  84 y/o ; Height:  .2 cm cm; Weight:   WT  81.194 kg kg.      High Cholesterol;  HTN;  CAD;  Syncope;07/2023  Abnormal EKG;  Family  HX CAD;  AVS  Carotid Stenosis  PPM 07/2023      Denies smoking.      Cardiac catheterization on 2008.  CABG on 2008  X4.      COMPARISON:  Previous nuclear testing completed on2020 at [facility].          ACCESSION NUMBER(S):  [id number]      ORDERING CLINICIAN:  CARLOS EM      TECHNIQUE:  ONE DAY protocol.  Stress injection: Date:01/16/24, 33.3 mCi of Myoview IV 20 seconds  after rapid injection of Lexiscan. Rest injection: Date: 01/16/24,  10.3 mCi of Myoview IV at rest. The patient had a rapid injection of  0.4 mg of Lexiscan IV over 10 seconds. Imaging was performed by  gated tomographic technique. Reason for Lexiscan: NEW PPM- PER MV      STRESS TEST DATA:  Resting heart rate was 63 BPM.  Resting blood pressure was 130/68 mmHg.  Peak blood pressure was 124/64 mmHg.  Peak heart rate was 77 BPM.      TEST TERMINATED DUE TO:  Protocol completed      FINDINGS:  STRESS TEST RESULTS:      Resting electrocardiogram revealed ectopic atrial rhythm, incomplete  right bundle branch block, poor R-wave progression. There were no  significant ischemic ECG changes or dysrhythmias. The patient did not  have chest pains/symptoms during procedure. There was a normal  recovery phase.      IMAGING RESULTS:      Image quality was good.  Rest and stress tomographic images were reviewed and revealed normal  perfusion without evidence of ischemia, myocardial infarction, or  left ventricular dilatation with stress. Overall left ventricular  systolic function appeared to be normal without regional wall motion  abnormalities. Ejection fraction was 64%.  TID is 1.02 and is normal.  There was not evidence of breast/motion/diaphragmatic attenuation  artifact.      IMPRESSION:  Normal Lexiscan Myoview cardiac perfusion stress test.  No evidence of ischemia or myocardial infarction by perfusion imaging.  Normal left ventricular systolic function, ejection fraction 64%.  Noninvasive risk stratification: Low risk.  Comparison study dated February 11, 2020 was negative for ischemia or  infarction. Calculated LV ejection fraction 66%.      Signed by: Javier  Warren 1/16/2024 12:02 PM  Dictation workstation:   [id number]      Radiology:     No orders to display       Problem List:     Patient Active Problem List   Diagnosis    Asymptomatic carotid artery stenosis    Atherosclerosis of CABG, unsp, w unsp angina pectoris (CMS-[facility])    CAD (coronary artery disease)    Known medical problems    Altered mental status    Cardiac pacemaker    Dyslipidemia    Erectile dysfunction    GERD (gastroesophageal reflux disease)    HTN (hypertension)    Syncope, near    Hyperlipidemia    Malignant essential hypertension    BMI 29.0-29.9,adult    Syncope    Urinary incontinence    Vitamin D deficiency    Complete heart block (Multi)    Drug therapy    Never smoked cigarettes    Encounter for medication review and counseling    Encounter to discuss treatment options    BMI 27.0-27.9,adult    Screening for prostate cancer    Screening for condition    Preventative health care    Seborrheic keratoses       Asessment:     Problem List Items Addressed This Visit       CAD (coronary artery disease)    HTN (hypertension)    Hyperlipidemia    Complete heart block (Multi)    Never smoked cigarettes    BMI 27.0-27.9,adult     83-year-old gentleman here for cardiovascular follow-up and to review cardiovascular studies.     Meds, vitals, examination as noted.     Chart reviewed in detail discussed with the patient and his wife.     Impression:  ASHD class II  Remote coronary artery bypass surgery  Normal cardiac function studies with normal perfusion and normal LV function  Permanent pacemaker  Mild mitral and tricuspid regurgitation  Hypertension controlled  Dyslipidemia  Mild cognitive dysfunction  Non-smoker  Plan:   Recommendation:  Increase Crestor to 20 daily  Recheck labs in several months  See me in 6 months  Call if any problems or issues  Maintain normal exercise and activity  Device clinic follow-up as usual

## 2024-08-29 DIAGNOSIS — I10 HYPERTENSION, UNSPECIFIED TYPE: ICD-10-CM

## 2024-08-30 RX ORDER — METOPROLOL TARTRATE 50 MG/1
TABLET ORAL
Qty: 100 TABLET | Refills: 3 | Status: SHIPPED | OUTPATIENT
Start: 2024-08-30

## 2024-09-03 DIAGNOSIS — I10 HYPERTENSION, UNSPECIFIED TYPE: ICD-10-CM

## 2024-09-04 RX ORDER — METOPROLOL TARTRATE 50 MG/1
TABLET ORAL
Qty: 100 TABLET | Refills: 3 | OUTPATIENT
Start: 2024-09-04

## 2024-09-16 DIAGNOSIS — I10 PRIMARY HYPERTENSION: ICD-10-CM

## 2024-09-17 RX ORDER — OLMESARTAN MEDOXOMIL 20 MG/1
20 TABLET ORAL DAILY
Qty: 100 TABLET | Refills: 3 | Status: SHIPPED | OUTPATIENT
Start: 2024-09-17

## 2024-09-23 ENCOUNTER — TELEPHONE (OUTPATIENT)
Dept: PRIMARY CARE | Facility: CLINIC | Age: 84
End: 2024-09-23
Payer: MEDICARE

## 2024-10-16 ENCOUNTER — HOSPITAL ENCOUNTER (OUTPATIENT)
Dept: CARDIOLOGY | Facility: HOSPITAL | Age: 84
Discharge: HOME | End: 2024-10-16
Payer: MEDICARE

## 2024-10-16 ENCOUNTER — APPOINTMENT (OUTPATIENT)
Dept: CARDIOLOGY | Facility: CLINIC | Age: 84
End: 2024-10-16
Payer: MEDICARE

## 2024-10-16 VITALS
BODY MASS INDEX: 27.39 KG/M2 | WEIGHT: 174.5 LBS | DIASTOLIC BLOOD PRESSURE: 74 MMHG | HEART RATE: 68 BPM | HEIGHT: 67 IN | SYSTOLIC BLOOD PRESSURE: 110 MMHG

## 2024-10-16 DIAGNOSIS — Z78.9 NEVER SMOKED CIGARETTES: ICD-10-CM

## 2024-10-16 DIAGNOSIS — Z95.0 CARDIAC PACEMAKER: ICD-10-CM

## 2024-10-16 DIAGNOSIS — I25.10 CORONARY ARTERY DISEASE INVOLVING NATIVE CORONARY ARTERY OF NATIVE HEART WITHOUT ANGINA PECTORIS: ICD-10-CM

## 2024-10-16 DIAGNOSIS — I25.709: ICD-10-CM

## 2024-10-16 DIAGNOSIS — I10 MALIGNANT ESSENTIAL HYPERTENSION: ICD-10-CM

## 2024-10-16 DIAGNOSIS — Z95.0 PACEMAKER: Primary | ICD-10-CM

## 2024-10-16 DIAGNOSIS — E78.2 MIXED HYPERLIPIDEMIA: ICD-10-CM

## 2024-10-16 DIAGNOSIS — I10 PRIMARY HYPERTENSION: ICD-10-CM

## 2024-10-16 DIAGNOSIS — I44.2 COMPLETE HEART BLOCK: ICD-10-CM

## 2024-10-16 DIAGNOSIS — E78.5 DYSLIPIDEMIA: ICD-10-CM

## 2024-10-16 PROCEDURE — 99214 OFFICE O/P EST MOD 30 MIN: CPT | Performed by: NURSE PRACTITIONER

## 2024-10-16 PROCEDURE — 93000 ELECTROCARDIOGRAM COMPLETE: CPT | Mod: DISTINCT PROCEDURAL SERVICE | Performed by: INTERNAL MEDICINE

## 2024-10-16 PROCEDURE — 3074F SYST BP LT 130 MM HG: CPT | Performed by: NURSE PRACTITIONER

## 2024-10-16 PROCEDURE — 3078F DIAST BP <80 MM HG: CPT | Performed by: NURSE PRACTITIONER

## 2024-10-16 PROCEDURE — 1159F MED LIST DOCD IN RCRD: CPT | Performed by: NURSE PRACTITIONER

## 2024-10-16 PROCEDURE — 93280 PM DEVICE PROGR EVAL DUAL: CPT

## 2024-10-16 PROCEDURE — 1160F RVW MEDS BY RX/DR IN RCRD: CPT | Performed by: NURSE PRACTITIONER

## 2024-10-16 NOTE — PROGRESS NOTES
"CARDIOLOGY OFFICE VISIT      CHIEF COMPLAINT  Chief Complaint   Patient presents with    Follow-up     Pt is here today following up after 6 months with device check      Chief complaint: \"I am doing fine.\"  HISTORY OF PRESENT ILLNESS  HPI  History: The patient is an 84-year-old  male who is followed for syncope secondary to complete heart block.  He underwent placement of a transvenous pacemaker followed by dual-chamber pacemaker implant on July 12, 2023.  He is also followed for coronary artery disease status post 5 vessel coronary artery bypass graft surgery on December 12, 2008, dyslipidemia on statin, hypertension, valvular heart disease with normal left ventricular function.  He denies chest pain, palpitations, dizziness, lightheadedness, shortness of breath, abdominal distention, or lower extremity edema.  Past Medical History  Past Medical History:   Diagnosis Date    Adverse effect of unspecified drugs, medicaments and biological substances, initial encounter 08/27/2018    Drug reaction, initial encounter    Coronary artery disease     Hyperlipidemia     Hypertension     Personal history of diseases of the blood and blood-forming organs and certain disorders involving the immune mechanism 08/30/2022    History of anemia    Personal history of other specified conditions 12/09/2019    History of abdominal pain       Social History  Social History     Tobacco Use    Smoking status: Never    Smokeless tobacco: Never   Substance Use Topics    Alcohol use: Yes     Comment: rarely    Drug use: Not Currently       Family History     Family History   Problem Relation Name Age of Onset    Asthma Mother      Other (Death of natural cause) Mother      Hypertension Father      Coronary artery disease Father      Other (myocardial infarction) Father      Stroke Other Grandparent         CVA        Allergies:  No Known Allergies     Outpatient Medications:  Current Outpatient Medications   Medication Instructions "    aspirin 81 mg EC tablet 1 tablet, Daily    famotidine (PEPCID) 20 mg, oral, 2 times daily, if needed for BREAKTHROUGH HEARTBURN    metoprolol tartrate (Lopressor) 50 mg tablet 1 po qd    olmesartan (BENICAR) 20 mg, oral, Daily    rosuvastatin (CRESTOR) 20 mg, oral, Daily    sildenafil (Viagra) 50 mg tablet 0.5-2 tablets          REVIEW OF SYSTEMS  Review of Systems   All other systems reviewed and are negative.        VITALS  Vitals:    10/16/24 0817   BP: 110/74   Pulse: 68       PHYSICAL EXAM  Vitals and nursing note reviewed.   Constitutional:       Appearance: Normal appearance.   HENT:      Head: Normocephalic.   Neck:      Vascular: No JVD. Carotid upstrokes II/IV.  Cardiovascular:      Rate and Rhythm: Normal rate and regular rhythm.      Pulses: Normal pulses.      Heart sounds: Normal S1 S2, no S3 S4.  No murmurs or rubs.  Pulmonary:      Effort: Pulmonary effort is normal. Respirations regular and nonlabored.     Breath sounds: Clear to auscultation posterior laterally.  Abdominal:      General: Bowel sounds are normal.      Palpations: Abdomen is soft.   Musculoskeletal:         General: Normal range of motion.      Cervical back: Normal range of motion.   Skin:     General: Skin is warm and dry.  Left subclavian pacemaker pocket is well-healed without redness swelling or drainage.  Neurological:      General: No focal deficit present.      Mental Status: Alert and oriented to person, place, and time.      Motor: Motor function is intact.   Psychiatric:         Attention and Perception: Attention and perception normal.         Mood and Affect: Mood and affect normal.         Speech: Speech normal.         Behavior: Behavior normal. Behavior is cooperative.         Thought Content: Thought content normal.         Cognition and Memory: Cognition and memory normal.     Labs and testing: Twelve-lead EKG reveals atrial and ventricular pacing at 68 bpm, prolonged AV conduction with a LA interval of 276 ms.   QRS durations 124 ms,  ms, QTc 440 ms.  Pacemaker interrogation dated October 16, 2024 reveals atrial pacing 45.8%, ventricular pacing 81.6%.  The AT/AF burden is less than 0.1%.  No atrial or ventricular detections were noted.  Good sensing capture thresholds are noted.  Estimated battery longevity is 10 years and 4 months.      ASSESSMENT AND PLAN    Clinical impressions:  1.  Syncope secondary to complete heart block status post dual-chamber pacemaker implant (Medtronic Tanya XT DR BETTS) on July 12, 2023 utilizing an epicardial LV lead placed on December 15, 2008 and placement of a new right atrial lead on July 12, 2023.  2.  Coronary artery disease status post 5 vessel coronary artery bypass graft on December 12, 2008 consisting of a LIMA graft to the LAD, saphenous vein graft to the high lateral, circumflex, terminal circumflex, and PDA of the RCA with left atrial appendage ligation.  Lexiscan stress test dated February 11, 2020 revealed an ejection fraction of 66% with no acute ischemic changes or infarct patterns noted.  3.  Dyslipidemia on statin.  4.  Hypertension, controlled with a blood pressure today of 110/74.  5.  Valvular heart disease consisting of 1+ MR, moderate mitral annular calcification, moderate to severe calcification of the posterior mitral valve leaflet and 1+ TR per 2D echocardiogram dated January 16, 2024.  6.  Moderate left atrial enlargement per 2D echocardiogram dated July 10, 2023.  7.  Normal left ventricular function with an ejection fraction of 60 to 65% per 2D echocardiogram dated January 16, 2024.  8.  Overweight with a BMI of 27.33.     Recommendations:  1.  Continue current medications as prescribed.  2.  Obtain a remote device interrogation on January 21, 2025 and a in clinic check in 6 months prior to the office visit with Dr. Aguiar.  3.  Follow-up in office with Dr. Aguiar in 6 months or sooner if needed.  4.  Follow-up in office with Dr. Menjivar on January 28, 2025 at 12  PM schedule or sooner if needed.    Evaluation and note by Libby Dallas, CNP  **Please excuse any errors in grammar or translation related to this dictation.  Voice recognition software was utilized to prepare this document.**

## 2025-01-02 ENCOUNTER — OFFICE VISIT (OUTPATIENT)
Dept: PRIMARY CARE | Facility: CLINIC | Age: 85
End: 2025-01-02
Payer: MEDICARE

## 2025-01-02 VITALS
WEIGHT: 175 LBS | OXYGEN SATURATION: 94 % | HEIGHT: 67 IN | BODY MASS INDEX: 27.47 KG/M2 | SYSTOLIC BLOOD PRESSURE: 127 MMHG | HEART RATE: 84 BPM | DIASTOLIC BLOOD PRESSURE: 69 MMHG

## 2025-01-02 DIAGNOSIS — M25.512 CHRONIC LEFT SHOULDER PAIN: Primary | ICD-10-CM

## 2025-01-02 DIAGNOSIS — G89.29 CHRONIC LEFT SHOULDER PAIN: Primary | ICD-10-CM

## 2025-01-02 PROCEDURE — 1123F ACP DISCUSS/DSCN MKR DOCD: CPT | Performed by: FAMILY MEDICINE

## 2025-01-02 PROCEDURE — 1159F MED LIST DOCD IN RCRD: CPT | Performed by: FAMILY MEDICINE

## 2025-01-02 PROCEDURE — 1158F ADVNC CARE PLAN TLK DOCD: CPT | Performed by: FAMILY MEDICINE

## 2025-01-02 PROCEDURE — 3078F DIAST BP <80 MM HG: CPT | Performed by: FAMILY MEDICINE

## 2025-01-02 PROCEDURE — 99213 OFFICE O/P EST LOW 20 MIN: CPT | Performed by: FAMILY MEDICINE

## 2025-01-02 PROCEDURE — 3074F SYST BP LT 130 MM HG: CPT | Performed by: FAMILY MEDICINE

## 2025-01-02 PROCEDURE — 1036F TOBACCO NON-USER: CPT | Performed by: FAMILY MEDICINE

## 2025-01-02 NOTE — PATIENT INSTRUCTIONS
Shoulder pain.  Been bothering patient about 3 to 4 months.  Heating pad helps. -->>  Recommend trying over-the-counter Aleve/naproxen 220 mg up to twice daily with food for a couple of days whenever it bothers you.  Could also try topicals like capsaicin or Voltaren gel.  If it gets bad enough, let us know and we could do a referral to physical therapy for further evaluation and treatment, or you could see orthopedics, let us know if you would like a referral there.      Follow-up next this summer as planned.

## 2025-01-02 NOTE — PROGRESS NOTES
0Subjective   Patient ID: Jones Avila is a 84 y.o. male who presents for Shoulder Pain (Pt in today with c/o left shoulder pain).    Review of Systems  Denies N/V/D/C, no HA/S/V, denies rashes/lesions, no CP/SOB. Denies fevers/chills.  Positive for left shoulder pain.  All other systems were negative.    Objective   Physical Exam  Gen: NAD  eyes: EOMI, PERRLA  ENT: hearing grossly intact, no nasal discharge  resp: CTABL, without R/R  heart: RRR without MRG  GI: abd: S/ND/NT, BS+  lymph: no axillary, cervical, supraclavicular lymphadenopathy noted   MS: gait grossly WNL,  derm: Possible 1.5 cm hyperpigmented raised slightly scaly patch on right side of back around T10.  neuro: CN II-XII grossly intact  psych: A&Ox3    Assessment/Plan   There are no diagnoses linked to this encounter.        Toi Anderson DO 01/02/25 10:29 AM       Shoulder pain.  Been bothering patient about 3 to 4 months.  Heating pad helps. -->>  Recommend trying over-the-counter Aleve/naproxen 220 mg up to twice daily with food for a couple of days whenever it bothers you.  Could also try topicals like capsaicin or Voltaren gel.  If it gets bad enough, let us know and we could do a referral to physical therapy for further evaluation and treatment, or you could see orthopedics, let us know if you would like a referral there.      Follow-up next this summer as planned.

## 2025-01-22 ENCOUNTER — HOSPITAL ENCOUNTER (OUTPATIENT)
Dept: CARDIOLOGY | Facility: HOSPITAL | Age: 85
Discharge: HOME | End: 2025-01-22
Payer: MEDICARE

## 2025-01-22 DIAGNOSIS — I44.1 ATRIOVENTRICULAR BLOCK, MOBITZ TYPE 2: ICD-10-CM

## 2025-01-22 DIAGNOSIS — Z95.0 PACEMAKER: ICD-10-CM

## 2025-01-22 PROCEDURE — 93296 REM INTERROG EVL PM/IDS: CPT

## 2025-01-28 ENCOUNTER — APPOINTMENT (OUTPATIENT)
Dept: CARDIOLOGY | Facility: CLINIC | Age: 85
End: 2025-01-28
Payer: MEDICARE

## 2025-01-28 VITALS
SYSTOLIC BLOOD PRESSURE: 118 MMHG | HEART RATE: 70 BPM | WEIGHT: 174.6 LBS | BODY MASS INDEX: 27.35 KG/M2 | DIASTOLIC BLOOD PRESSURE: 78 MMHG

## 2025-01-28 DIAGNOSIS — I25.709: ICD-10-CM

## 2025-01-28 DIAGNOSIS — I10 PRIMARY HYPERTENSION: ICD-10-CM

## 2025-01-28 DIAGNOSIS — E78.2 MIXED HYPERLIPIDEMIA: ICD-10-CM

## 2025-01-28 DIAGNOSIS — Z78.9 NEVER SMOKED CIGARETTES: ICD-10-CM

## 2025-01-28 DIAGNOSIS — Z95.0 CARDIAC PACEMAKER: ICD-10-CM

## 2025-01-28 DIAGNOSIS — I25.10 CORONARY ARTERY DISEASE INVOLVING NATIVE CORONARY ARTERY OF NATIVE HEART WITHOUT ANGINA PECTORIS: ICD-10-CM

## 2025-01-28 DIAGNOSIS — I65.29 ASYMPTOMATIC CAROTID ARTERY STENOSIS, UNSPECIFIED LATERALITY: ICD-10-CM

## 2025-01-28 PROCEDURE — 3074F SYST BP LT 130 MM HG: CPT | Performed by: INTERNAL MEDICINE

## 2025-01-28 PROCEDURE — 3078F DIAST BP <80 MM HG: CPT | Performed by: INTERNAL MEDICINE

## 2025-01-28 PROCEDURE — 1123F ACP DISCUSS/DSCN MKR DOCD: CPT | Performed by: INTERNAL MEDICINE

## 2025-01-28 PROCEDURE — 1036F TOBACCO NON-USER: CPT | Performed by: INTERNAL MEDICINE

## 2025-01-28 PROCEDURE — 1159F MED LIST DOCD IN RCRD: CPT | Performed by: INTERNAL MEDICINE

## 2025-01-28 PROCEDURE — 99214 OFFICE O/P EST MOD 30 MIN: CPT | Performed by: INTERNAL MEDICINE

## 2025-01-28 NOTE — PROGRESS NOTES
Patient:  Jonse Avila  YOB: 1940  MRN: [id number]       HPI:       Jones Avila is a 84 y.o. male who returns today for cardiac follow-up.  Patient is doing well.  He denies chest pain shortness of breath or other complaints.  Has history coronary disease with prior bypass.  He also has a pacemaker followed by Dr. Denton.  He has hypertension dyslipidemia both of which are stable.  He denies chest pain or shortness of breath.  Is ambulatory without any difficulties.  His exam is unchanged.    Objective:     Vitals:    01/28/25 1215   BP: 118/78   Pulse: 70       Wt Readings from Last 4 Encounters:   01/28/25 79.2 kg (174 lb 9.6 oz)   01/02/25 79.4 kg (175 lb)   10/16/24 79.2 kg (174 lb 8 oz)   07/30/24 78.5 kg (173 lb)       Allergies:     No Known Allergies     Medications:     Current Outpatient Medications   Medication Instructions    aspirin 81 mg EC tablet 1 tablet, Daily    famotidine (PEPCID) 20 mg, oral, 2 times daily, if needed for BREAKTHROUGH HEARTBURN    metoprolol tartrate (Lopressor) 50 mg tablet 1 po qd    olmesartan (BENICAR) 20 mg, oral, Daily    rosuvastatin (CRESTOR) 20 mg, oral, Daily    sildenafil (Viagra) 50 mg tablet 0.5-2 tablets       Physical Examination:     Constitutional:       Appearance: Healthy appearance. Not in distress.   Neck:      Vascular: No JVR. JVD normal.   Pulmonary:      Effort: Pulmonary effort is normal.      Breath sounds: Normal breath sounds. No wheezing. No rhonchi. No rales.   Chest:      Chest wall: Not tender to palpatation.   Cardiovascular:      PMI at left midclavicular line. Normal rate. Regular rhythm. Normal S1. Normal S2.       Murmurs: There is no murmur.      No gallop.  No click. No rub.   Pulses:     Intact distal pulses.   Edema:     Peripheral edema absent.   Abdominal:      General: Bowel sounds are normal.      Palpations: Abdomen is soft.      Tenderness: There is no abdominal tenderness.   Musculoskeletal: Normal range of motion.        "  General: No tenderness. Skin:     General: Skin is warm and dry.   Neurological:      General: No focal deficit present.      Mental Status: Alert and oriented to person, place and time.          Lab:     CBC:   Lab Results   Component Value Date    WBC 8.3 07/02/2024    RBC 4.36 (L) 07/02/2024    HGB 14.4 07/02/2024    HCT 43.2 07/02/2024     07/02/2024        CMP:    Lab Results   Component Value Date     07/02/2024    K 4.3 07/02/2024     07/02/2024    CO2 29 07/02/2024    BUN 16 07/02/2024    CREATININE 1.12 07/02/2024    GLUCOSE 89 07/02/2024    CALCIUM 9.0 07/02/2024       Magnesium:    Lab Results   Component Value Date    MG 2.13 07/02/2024       Lipid Profile:    Lab Results   Component Value Date    TRIG 114 07/02/2024    HDL 47.4 07/02/2024    LDLCALC 125 (H) 07/02/2024       TSH:    Lab Results   Component Value Date    TSH 1.87 07/02/2024       BNP:   No results found for: \"BNP\"     PT/INR:    Lab Results   Component Value Date    PROTIME 13.3 (H) 07/09/2023    INR 1.2 (H) 07/09/2023       HgBA1c:    Lab Results   Component Value Date    HGBA1C 5.2 07/02/2024       BMP:  Lab Results   Component Value Date     07/02/2024     07/13/2023     07/12/2023     07/11/2023    K 4.3 07/02/2024    K 4.1 07/13/2023    K 4.0 07/12/2023    K 3.8 07/11/2023     07/02/2024     07/13/2023     07/12/2023     07/11/2023    CO2 29 07/02/2024    CO2 26 07/13/2023    CO2 27 07/12/2023    CO2 27 07/11/2023    BUN 16 07/02/2024    BUN 12 07/13/2023    BUN 16 07/12/2023    BUN 14 07/11/2023    CREATININE 1.12 07/02/2024    CREATININE 0.87 07/13/2023    CREATININE 0.98 07/12/2023    CREATININE 1.01 07/11/2023       CBC:  Lab Results   Component Value Date    WBC 8.3 07/02/2024    WBC 11.7 (H) 07/13/2023    WBC 10.9 07/12/2023    WBC 9.9 07/11/2023    RBC 4.36 (L) 07/02/2024    RBC 4.15 (L) 07/13/2023    RBC 4.36 (L) 07/12/2023    RBC 4.19 (L) 07/11/2023    HGB " 14.4 07/02/2024    HGB 13.5 07/13/2023    HGB 14.1 07/12/2023    HGB 13.7 07/11/2023    HCT 43.2 07/02/2024    HCT 38.8 (L) 07/13/2023    HCT 41.4 07/12/2023    HCT 39.7 (L) 07/11/2023    MCV 99 07/02/2024    MCV 93 07/13/2023    MCV 95 07/12/2023    MCV 95 07/11/2023    MCH 33.0 07/02/2024    MCHC 33.3 07/02/2024    MCHC 34.8 07/13/2023    MCHC 34.1 07/12/2023    MCHC 34.5 07/11/2023    RDW 13.2 07/02/2024    RDW 12.8 07/13/2023    RDW 12.9 07/12/2023    RDW 12.9 07/11/2023     07/02/2024     07/13/2023     07/12/2023     07/11/2023       Cardiac Enzymes:    Lab Results   Component Value Date    TROPHS 10 07/09/2023    TROPHS 10 07/09/2023       Hepatic Function Panel:    Lab Results   Component Value Date    ALKPHOS 60 07/02/2024    ALT 19 07/02/2024    AST 29 07/02/2024    PROT 6.4 07/02/2024    BILITOT 0.8 07/02/2024    BILIDIR 0.2 07/09/2023       Diagnostic Studies:     No results found.    Radiology:     No orders to display       Problem List:     Patient Active Problem List   Diagnosis    Asymptomatic carotid artery stenosis    Atherosclerosis of CABG, unsp, w unsp angina pectoris (CMS-HCC)    CAD (coronary artery disease)    Known medical problems    Altered mental status    Cardiac pacemaker    Dyslipidemia    Erectile dysfunction    GERD (gastroesophageal reflux disease)    HTN (hypertension)    Syncope, near    Hyperlipidemia    Malignant essential hypertension    BMI 29.0-29.9,adult    Syncope    Urinary incontinence    Vitamin D deficiency    Complete heart block    Drug therapy    Never smoked cigarettes    Encounter for medication review and counseling    Encounter to discuss treatment options    BMI 27.0-27.9,adult    Screening for prostate cancer    Screening for condition    Preventative health care    Seborrheic keratoses    Chronic left shoulder pain       Asessment:       84-year-old gentleman here for routine cardiovascular follow-up.    Meds, vitals, examination  as noted.    Chart reviewed in detail discussed with patient at length.  Back impression:  ASHD class II  Coronary artery bypass surgery  Cardiac pacemaker due to complete heart block  Dyslipidemia  Hypertension  Hyperlipidemia  Plan:   Recommendation:  Continue current meds  See me back in 6 months  Call if any issues or problems arise  Repeat cardiac studies in 1 year

## 2025-04-28 NOTE — PROGRESS NOTES
"  Patient ID: Jones Avila is a 84 y.o. male who presents for Follow-up (Pt. Present today for  6 month follow up with device check ).  History of Present Illness  Jones Avila is an 84 year old male with sinus node dysfunction and complete heart block who presents for a routine device check.    The pacemaker has been in place for nearly two years and is functioning as expected. It paces 43.7% of the time from the atrium when the heart rate drops below 60 beats per minute and 87.8% of the time from the ventricle due to complete heart block. He has experienced no issues with heart rhythm, hospitalizations, or emergency room visits related to his heart condition since the last visit.    He feels well and has not experienced any episodes of atrial fibrillation, irregular beats, or ventricular tachycardia. He is currently taking metoprolol once daily and has about half a bottle remaining. There are no issues with chest discomfort, shortness of breath, or swelling in the legs.    PMHx:  See list    PSHx:  See list    Social Hx:  Social History[1]    Family Hx:  Family History[2]    Review of Systems   Cardiovascular:  Negative for chest pain, dyspnea on exertion and palpitations.   All other systems reviewed and are negative.        Objective     BP 98/60 (BP Location: Left arm, Patient Position: Sitting)   Pulse 68   Ht 1.702 m (5' 7\")   Wt 75.8 kg (167 lb)   BMI 26.16 kg/m²        LABS:  CBC:   Lab Results   Component Value Date    WBC 8.3 07/02/2024    RBC 4.36 (L) 07/02/2024    HGB 14.4 07/02/2024    HCT 43.2 07/02/2024    MCV 99 07/02/2024    MCH 33.0 07/02/2024    MCHC 33.3 07/02/2024    RDW 13.2 07/02/2024     07/02/2024     CBC with Differential:    Lab Results   Component Value Date    WBC 8.3 07/02/2024    RBC 4.36 (L) 07/02/2024    HGB 14.4 07/02/2024    HCT 43.2 07/02/2024     07/02/2024    MCV 99 07/02/2024    MCH 33.0 07/02/2024    MCHC 33.3 07/02/2024    RDW 13.2 07/02/2024    NRBC 0.0 " 07/02/2024    LYMPHOPCT 26.9 07/02/2024    MONOPCT 11.9 07/02/2024    EOSPCT 5.4 07/02/2024    BASOPCT 1.0 07/02/2024    MONOSABS 0.98 (H) 07/02/2024    LYMPHSABS 2.22 07/02/2024    EOSABS 0.45 (H) 07/02/2024    BASOSABS 0.08 07/02/2024     CMP:    Lab Results   Component Value Date     07/02/2024    K 4.3 07/02/2024     07/02/2024    CO2 29 07/02/2024    BUN 16 07/02/2024    CREATININE 1.12 07/02/2024    GLUCOSE 89 07/02/2024    PROT 6.4 07/02/2024    CALCIUM 9.0 07/02/2024    BILITOT 0.8 07/02/2024    ALKPHOS 60 07/02/2024    AST 29 07/02/2024    ALT 19 07/02/2024     BMP:    Lab Results   Component Value Date     07/02/2024    K 4.3 07/02/2024     07/02/2024    CO2 29 07/02/2024    BUN 16 07/02/2024    CREATININE 1.12 07/02/2024    CALCIUM 9.0 07/02/2024    GLUCOSE 89 07/02/2024     Magnesium:  Lab Results   Component Value Date    MG 2.13 07/02/2024           Physical Exam  Constitutional:       General: Awake.      Appearance: Normal and healthy appearance. Well-developed and not in distress.   Neck:      Vascular: No JVR. JVD normal.   Pulmonary:      Effort: Pulmonary effort is normal.      Breath sounds: Normal breath sounds. No wheezing. No rhonchi. No rales.   Chest:      Chest wall: Not tender to palpatation.   Cardiovascular:      PMI at left midclavicular line. Normal rate. Regular rhythm. Normal S1. Normal S2.       Murmurs: There is no murmur.      No gallop.  No click. No rub.   Pulses:     Intact distal pulses.   Edema:     Peripheral edema absent.   Abdominal:      Tenderness: There is no abdominal tenderness.   Musculoskeletal: Normal range of motion.         General: No tenderness. Skin:     General: Skin is warm and dry.   Neurological:      General: No focal deficit present.      Mental Status: Alert and oriented to person, place and time.           Device check. See scanned.  ECG. See scanned.    Assessment & Plan  Complete heart block  Medtronic [id number] pacemaker    Pacemaker functioning well with over ten years lifespan expected. No complications. Site healed and flat. Device pacing appropriately: 43.7% atrial, 87.8% ventricular. No arrhythmias or hospitalizations. ECG normal heart rate in sixties bpm. No atrial fibrillation or ventricular tachycardia.  - Continue current pacemaker management.  - Schedule annual device check and EP office visit.  - Perform remote monitoring at 3. 6. And 9 months.      Coronary artery disease, s/p remote CABG. S/p JEFF ligation in 2008.  Medication management  Metoprolol taken once daily, appropriate for tolerance  - Refill metoprolol prescription.  - Monitor for side effects or heart rhythm changes.    Counseled greater than 50% of the visit.  The patient, family, and I discussed arrhythmia, ECG, shared decision making, CHB, pacemaker, medications, treatment options, risk, benefits, and imponderables.  Lifestyle modifications reviewed.  All questions answered.  Patient appreciative of care  Assessment & Plan  Pacemaker    Complete heart block    Coronary artery disease involving native coronary artery of native heart without angina pectoris    Cardiac pacemaker    Primary hypertension    Mixed hyperlipidemia    BMI 26.0-26.9,adult    Encounter for medication review and counseling    Encounter to discuss treatment options    Never smoked cigarettes      Christina Padgett RN     This medical note was created with the assistance of artificial intelligence (AI) for documentation purposes. The content has been reviewed and confirmed by the healthcare provider for accuracy and completeness. Patient consented to the use of audio recording and use of AI during their visit.     I, , personally performed the services described in the documentation as scribed by the nurse in my presence, and confirm it is both accurate and complete.     Total 43, inclusive review of device checks, CHB, bradycardia, medications, and office notes       [1]   Social  History  Socioeconomic History    Marital status:    Tobacco Use    Smoking status: Never     Passive exposure: Never    Smokeless tobacco: Never   Substance and Sexual Activity    Alcohol use: Yes     Comment: rarely    Drug use: Not Currently    Sexual activity: Defer   [2]   Family History  Problem Relation Name Age of Onset    Asthma Mother      Other (Death of natural cause) Mother      Hypertension Father      Coronary artery disease Father      Other (myocardial infarction) Father      Stroke Other Grandparent         CVA

## 2025-04-29 ENCOUNTER — APPOINTMENT (OUTPATIENT)
Dept: CARDIOLOGY | Facility: CLINIC | Age: 85
End: 2025-04-29

## 2025-04-29 ENCOUNTER — HOSPITAL ENCOUNTER (OUTPATIENT)
Dept: CARDIOLOGY | Facility: HOSPITAL | Age: 85
Discharge: HOME | End: 2025-04-29
Payer: MEDICARE

## 2025-04-29 VITALS
HEART RATE: 68 BPM | SYSTOLIC BLOOD PRESSURE: 98 MMHG | DIASTOLIC BLOOD PRESSURE: 60 MMHG | BODY MASS INDEX: 26.21 KG/M2 | HEIGHT: 67 IN | WEIGHT: 167 LBS

## 2025-04-29 DIAGNOSIS — I25.10 CORONARY ARTERY DISEASE INVOLVING NATIVE CORONARY ARTERY OF NATIVE HEART WITHOUT ANGINA PECTORIS: ICD-10-CM

## 2025-04-29 DIAGNOSIS — Z95.0 PACEMAKER: ICD-10-CM

## 2025-04-29 DIAGNOSIS — Z95.0 CARDIAC PACEMAKER: ICD-10-CM

## 2025-04-29 DIAGNOSIS — Z78.9 NEVER SMOKED CIGARETTES: ICD-10-CM

## 2025-04-29 DIAGNOSIS — E78.2 MIXED HYPERLIPIDEMIA: ICD-10-CM

## 2025-04-29 DIAGNOSIS — I44.2 COMPLETE HEART BLOCK: ICD-10-CM

## 2025-04-29 DIAGNOSIS — Z71.89 ENCOUNTER TO DISCUSS TREATMENT OPTIONS: ICD-10-CM

## 2025-04-29 DIAGNOSIS — I44.2 COMPLETE HEART BLOCK: Primary | ICD-10-CM

## 2025-04-29 DIAGNOSIS — Z71.89 ENCOUNTER FOR MEDICATION REVIEW AND COUNSELING: ICD-10-CM

## 2025-04-29 DIAGNOSIS — I10 PRIMARY HYPERTENSION: ICD-10-CM

## 2025-04-29 PROCEDURE — 99215 OFFICE O/P EST HI 40 MIN: CPT | Performed by: INTERNAL MEDICINE

## 2025-04-29 PROCEDURE — 93280 PM DEVICE PROGR EVAL DUAL: CPT

## 2025-04-29 PROCEDURE — 1123F ACP DISCUSS/DSCN MKR DOCD: CPT | Performed by: INTERNAL MEDICINE

## 2025-04-29 PROCEDURE — 1036F TOBACCO NON-USER: CPT | Performed by: INTERNAL MEDICINE

## 2025-04-29 PROCEDURE — 3074F SYST BP LT 130 MM HG: CPT | Performed by: INTERNAL MEDICINE

## 2025-04-29 PROCEDURE — 1159F MED LIST DOCD IN RCRD: CPT | Performed by: INTERNAL MEDICINE

## 2025-04-29 PROCEDURE — 93000 ELECTROCARDIOGRAM COMPLETE: CPT | Mod: DISTINCT PROCEDURAL SERVICE | Performed by: INTERNAL MEDICINE

## 2025-04-29 PROCEDURE — 3078F DIAST BP <80 MM HG: CPT | Performed by: INTERNAL MEDICINE

## 2025-04-29 ASSESSMENT — ENCOUNTER SYMPTOMS
PALPITATIONS: 0
DYSPNEA ON EXERTION: 0

## 2025-04-29 NOTE — PATIENT INSTRUCTIONS
Continue same medications/treatment.  Patient educated on proper medication use.  Patient educated on risk factor modification.  Please bring any lab results from other providers/physicians to your next appointment.    Please bring all medicines, vitamins, and herbal supplements with you when you come to the office.    Prescriptions will not be filled unless you are compliant with your follow up appointments or have a follow up appointment scheduled as per instruction of your physician. Refills should be requested at the time of your visit.    Follow up with Dr. Aguiar in 1 year with device check  Continue remote checks at 3, 6,and 9 months    DOMINIC THOMAS RN, AM SCRIBING FOR AND IN THE PRESENCE OF DR. JERILYN AGUIAR MD, FACC, FACP, RS

## 2025-07-11 ENCOUNTER — APPOINTMENT (OUTPATIENT)
Dept: PRIMARY CARE | Facility: CLINIC | Age: 85
End: 2025-07-11
Payer: MEDICARE

## 2025-07-15 ENCOUNTER — CLINICAL SUPPORT (OUTPATIENT)
Dept: PRIMARY CARE | Facility: CLINIC | Age: 85
End: 2025-07-15
Payer: MEDICARE

## 2025-07-15 DIAGNOSIS — E78.2 MIXED HYPERLIPIDEMIA: ICD-10-CM

## 2025-07-15 DIAGNOSIS — E78.5 DYSLIPIDEMIA: ICD-10-CM

## 2025-07-15 DIAGNOSIS — R55 SYNCOPE, NEAR: ICD-10-CM

## 2025-07-15 DIAGNOSIS — Z12.5 SCREENING FOR PROSTATE CANCER: ICD-10-CM

## 2025-07-15 DIAGNOSIS — Z13.9 SCREENING FOR CONDITION: ICD-10-CM

## 2025-07-15 DIAGNOSIS — Z79.899 DRUG THERAPY: ICD-10-CM

## 2025-07-15 DIAGNOSIS — E55.9 VITAMIN D DEFICIENCY: ICD-10-CM

## 2025-07-15 DIAGNOSIS — I25.10 CORONARY ARTERY DISEASE INVOLVING NATIVE CORONARY ARTERY OF NATIVE HEART WITHOUT ANGINA PECTORIS: ICD-10-CM

## 2025-07-15 DIAGNOSIS — K21.9 GASTROESOPHAGEAL REFLUX DISEASE, UNSPECIFIED WHETHER ESOPHAGITIS PRESENT: ICD-10-CM

## 2025-07-15 DIAGNOSIS — I10 PRIMARY HYPERTENSION: ICD-10-CM

## 2025-07-15 NOTE — PROGRESS NOTES
Subjective   Patient ID: Jones Avila is a 84 y.o. male who presents for Labs Only (Pt in today for lab draw).    HPI     Review of Systems    Objective   There were no vitals taken for this visit.    Physical Exam    Assessment/Plan

## 2025-07-16 LAB
25(OH)D3+25(OH)D2 SERPL-MCNC: 53 NG/ML (ref 30–100)
ALBUMIN SERPL-MCNC: 4.3 G/DL (ref 3.6–5.1)
ALP SERPL-CCNC: 59 U/L (ref 35–144)
ALT SERPL-CCNC: 14 U/L (ref 9–46)
ANION GAP SERPL CALCULATED.4IONS-SCNC: 5 MMOL/L (CALC) (ref 7–17)
APPEARANCE UR: CLEAR
AST SERPL-CCNC: 21 U/L (ref 10–35)
BASOPHILS # BLD AUTO: 87 CELLS/UL (ref 0–200)
BASOPHILS NFR BLD AUTO: 1 %
BILIRUB SERPL-MCNC: 1.1 MG/DL (ref 0.2–1.2)
BILIRUB UR QL STRIP: NEGATIVE
BUN SERPL-MCNC: 15 MG/DL (ref 7–25)
CALCIUM SERPL-MCNC: 8.8 MG/DL (ref 8.6–10.3)
CHLORIDE SERPL-SCNC: 104 MMOL/L (ref 98–110)
CHOLEST SERPL-MCNC: 151 MG/DL
CHOLEST/HDLC SERPL: 2.7 (CALC)
CO2 SERPL-SCNC: 30 MMOL/L (ref 20–32)
COLOR UR: YELLOW
CREAT SERPL-MCNC: 0.98 MG/DL (ref 0.7–1.22)
EGFRCR SERPLBLD CKD-EPI 2021: 77 ML/MIN/1.73M2
EOSINOPHIL # BLD AUTO: 452 CELLS/UL (ref 15–500)
EOSINOPHIL NFR BLD AUTO: 5.2 %
ERYTHROCYTE [DISTWIDTH] IN BLOOD BY AUTOMATED COUNT: 13.5 % (ref 11–15)
EST. AVERAGE GLUCOSE BLD GHB EST-MCNC: 111 MG/DL
EST. AVERAGE GLUCOSE BLD GHB EST-SCNC: 6.2 MMOL/L
GLUCOSE SERPL-MCNC: 97 MG/DL (ref 65–99)
GLUCOSE UR QL STRIP: NEGATIVE
HBA1C MFR BLD: 5.5 %
HCT VFR BLD AUTO: 43.3 % (ref 38.5–50)
HDLC SERPL-MCNC: 56 MG/DL
HGB BLD-MCNC: 13.8 G/DL (ref 13.2–17.1)
HGB UR QL STRIP: NEGATIVE
KETONES UR QL STRIP: NEGATIVE
LDLC SERPL CALC-MCNC: 78 MG/DL (CALC)
LEUKOCYTE ESTERASE UR QL STRIP: NEGATIVE
LYMPHOCYTES # BLD AUTO: 2253 CELLS/UL (ref 850–3900)
LYMPHOCYTES NFR BLD AUTO: 25.9 %
MAGNESIUM SERPL-MCNC: 2.3 MG/DL (ref 1.5–2.5)
MCH RBC QN AUTO: 32.9 PG (ref 27–33)
MCHC RBC AUTO-ENTMCNC: 31.9 G/DL (ref 32–36)
MCV RBC AUTO: 103.3 FL (ref 80–100)
MONOCYTES # BLD AUTO: 1035 CELLS/UL (ref 200–950)
MONOCYTES NFR BLD AUTO: 11.9 %
NEUTROPHILS # BLD AUTO: 4872 CELLS/UL (ref 1500–7800)
NEUTROPHILS NFR BLD AUTO: 56 %
NITRITE UR QL STRIP: NEGATIVE
NONHDLC SERPL-MCNC: 95 MG/DL (CALC)
PH UR STRIP: 6.5 [PH] (ref 5–8)
PLATELET # BLD AUTO: 123 THOUSAND/UL (ref 140–400)
PMV BLD REES-ECKER: 11.2 FL (ref 7.5–12.5)
POTASSIUM SERPL-SCNC: 4.6 MMOL/L (ref 3.5–5.3)
PROT SERPL-MCNC: 6.5 G/DL (ref 6.1–8.1)
PROT UR QL STRIP: NEGATIVE
PSA SERPL-MCNC: 0.76 NG/ML
RBC # BLD AUTO: 4.19 MILLION/UL (ref 4.2–5.8)
SERVICE CMNT-IMP: ABNORMAL
SODIUM SERPL-SCNC: 139 MMOL/L (ref 135–146)
SP GR UR STRIP: 1.01 (ref 1–1.03)
TRIGL SERPL-MCNC: 89 MG/DL
TSH SERPL-ACNC: 1.49 MIU/L (ref 0.4–4.5)
WBC # BLD AUTO: 8.7 THOUSAND/UL (ref 3.8–10.8)

## 2025-07-18 ENCOUNTER — APPOINTMENT (OUTPATIENT)
Dept: PRIMARY CARE | Facility: CLINIC | Age: 85
End: 2025-07-18
Payer: MEDICARE

## 2025-07-18 VITALS
WEIGHT: 177 LBS | HEART RATE: 69 BPM | OXYGEN SATURATION: 96 % | DIASTOLIC BLOOD PRESSURE: 70 MMHG | HEIGHT: 65 IN | SYSTOLIC BLOOD PRESSURE: 111 MMHG | BODY MASS INDEX: 29.49 KG/M2

## 2025-07-18 DIAGNOSIS — Z00.00 PREVENTATIVE HEALTH CARE: ICD-10-CM

## 2025-07-18 DIAGNOSIS — Z12.5 SCREENING FOR PROSTATE CANCER: ICD-10-CM

## 2025-07-18 DIAGNOSIS — I25.10 CORONARY ARTERY DISEASE INVOLVING NATIVE CORONARY ARTERY OF NATIVE HEART WITHOUT ANGINA PECTORIS: ICD-10-CM

## 2025-07-18 DIAGNOSIS — D75.89 MACROCYTOSIS: ICD-10-CM

## 2025-07-18 DIAGNOSIS — E53.8 B12 DEFICIENCY: ICD-10-CM

## 2025-07-18 DIAGNOSIS — E55.9 VITAMIN D DEFICIENCY: ICD-10-CM

## 2025-07-18 DIAGNOSIS — L82.1 SEBORRHEIC KERATOSES: ICD-10-CM

## 2025-07-18 DIAGNOSIS — Z79.899 DRUG THERAPY: ICD-10-CM

## 2025-07-18 DIAGNOSIS — E78.2 MIXED HYPERLIPIDEMIA: ICD-10-CM

## 2025-07-18 DIAGNOSIS — D50.8 HYPOCHROMIC ERYTHROCYTES: ICD-10-CM

## 2025-07-18 DIAGNOSIS — Z13.9 SCREENING FOR CONDITION: ICD-10-CM

## 2025-07-18 DIAGNOSIS — Z00.00 ROUTINE GENERAL MEDICAL EXAMINATION AT HEALTH CARE FACILITY: Primary | ICD-10-CM

## 2025-07-18 PROCEDURE — 1159F MED LIST DOCD IN RCRD: CPT | Performed by: FAMILY MEDICINE

## 2025-07-18 PROCEDURE — 99214 OFFICE O/P EST MOD 30 MIN: CPT | Performed by: FAMILY MEDICINE

## 2025-07-18 PROCEDURE — 3074F SYST BP LT 130 MM HG: CPT | Performed by: FAMILY MEDICINE

## 2025-07-18 PROCEDURE — 1036F TOBACCO NON-USER: CPT | Performed by: FAMILY MEDICINE

## 2025-07-18 PROCEDURE — 1160F RVW MEDS BY RX/DR IN RCRD: CPT | Performed by: FAMILY MEDICINE

## 2025-07-18 PROCEDURE — 3078F DIAST BP <80 MM HG: CPT | Performed by: FAMILY MEDICINE

## 2025-07-18 PROCEDURE — 1170F FXNL STATUS ASSESSED: CPT | Performed by: FAMILY MEDICINE

## 2025-07-18 PROCEDURE — G0439 PPPS, SUBSEQ VISIT: HCPCS | Performed by: FAMILY MEDICINE

## 2025-07-18 ASSESSMENT — PATIENT HEALTH QUESTIONNAIRE - PHQ9
2. FEELING DOWN, DEPRESSED OR HOPELESS: NOT AT ALL
SUM OF ALL RESPONSES TO PHQ9 QUESTIONS 1 AND 2: 0
1. LITTLE INTEREST OR PLEASURE IN DOING THINGS: NOT AT ALL
SUM OF ALL RESPONSES TO PHQ9 QUESTIONS 1 AND 2: 0
SUM OF ALL RESPONSES TO PHQ9 QUESTIONS 1 AND 2: 0
2. FEELING DOWN, DEPRESSED OR HOPELESS: NOT AT ALL
2. FEELING DOWN, DEPRESSED OR HOPELESS: NOT AT ALL

## 2025-07-18 ASSESSMENT — ENCOUNTER SYMPTOMS
LOSS OF SENSATION IN FEET: 0
OCCASIONAL FEELINGS OF UNSTEADINESS: 0
DEPRESSION: 0

## 2025-07-18 ASSESSMENT — ACTIVITIES OF DAILY LIVING (ADL)
DRESSING: INDEPENDENT
BATHING: INDEPENDENT
GROCERY_SHOPPING: INDEPENDENT
DOING_HOUSEWORK: INDEPENDENT
MANAGING_FINANCES: INDEPENDENT
TAKING_MEDICATION: INDEPENDENT

## 2025-07-18 NOTE — PATIENT INSTRUCTIONS
Doing annual preventative and Medicare wellness today including annual lab review:     ----     Screening for prostate cancer.  PSA 0.76, was 0.73, totally normal.  Will check annually.     Immunization counseling: Up-to-date on all immunizations except is due for the second Shingrix Shingrix shot.  2021 was last Tdap.  -->>  Continue to follow-up with your pharmacy and keep up-to-date on immunizations.       -----        New annual labs reviewed:     - Hyperlipidemia, well-controlled on Crestor/rosuvastatin, prescribed by cardiology. -->> Follow-up as planned.  Also recommend you start coq10, 200 mg daily.  Buy the cheapest you can find it is totally good enough. Should be able to find it for 15 - 20 cents per pill. [employer] members Alessandro has $0.11 a pill, $20 for 180 pills.     - Macrocytosis with hypochromic erythrocytes without anemia. -->>  Will plan to check anemia panel with next annual labs.     - Vitamin D deficiency. 53, was 47, 51, 55, 49, 42, 43, 31 before that. Goal is . Is on vitamin D, not sure of the dose.  -->>  Double your dose of vitamin D what ever it is and will recheck with next annual labs.        - Drug therapy, screening for condition. A1c is 5.5, was 5.2, 5.2, 5.5, so no diabetes.  TSH within normal limits, so no thyroid problems noted.-->> will repeat annual labs around jul 2023         Coronary artery disease, bundle branch block, no syncopal episodes since getting pacemaker. Doing well.  Follow-up with cardiology/Dr. Menjivar and Dr. Aguiar.     Seborrheic keratosis, screening for skin cancer. -->> again recommend you follow-up with dermatology.     - Will schedule Medicare wellness visit with annual preventative and annual lab review in about 12 months.  - Patient will come in about a week before the appointment to get fasting annual labs including a PSA's screening.

## 2025-07-18 NOTE — PROGRESS NOTES
0Subjective   Patient ID: Jones Avila is a 84 y.o. male who presents for No chief complaint on file..    Review of Systems  Denies N/V/D/C, no HA/S/V, denies rashes/lesions, no CP/SOB. Denies fevers/chills.  Positive for raised hyperpigmented patch on back.  All other systems were negative.    Objective   Physical Exam  Gen: NAD  eyes: EOMI, PERRLA  ENT: hearing grossly intact, no nasal discharge  resp: CTABL, without R/R  heart: RRR without MRG  GI: abd: S/ND/NT, BS+  lymph: no axillary, cervical, supraclavicular lymphadenopathy noted   MS: gait grossly WNL,  derm: Possible 1.5 cm hyperpigmented raised slightly scaly patch on right side of back around T10.  neuro: CN II-XII grossly intact  psych: A&Ox3    Assessment/Plan   Diagnoses and all orders for this visit:  Routine general medical examination at health care facility  Drug therapy  -     Comprehensive Metabolic Panel; Future  -     Magnesium; Future  -     Urinalysis with Reflex Microscopic; Future  Screening for condition  -     TSH with reflex to Free T4 if abnormal; Future  -     Hemoglobin A1C; Future  Vitamin D deficiency  -     Vitamin D 25-Hydroxy,Total (for eval of Vitamin D levels); Future  Screening for prostate cancer  -     Prostate Spec.Ag,Screen; Future  Coronary artery disease involving native coronary artery of native heart without angina pectoris  Mixed hyperlipidemia  -     Lipid Panel; Future  Preventative health care  Seborrheic keratoses  Macrocytosis  B12 deficiency  -     Vitamin B12; Future  -     Folate; Future  Hypochromic erythrocytes  -     CBC and Auto Differential; Future  -     Iron and TIBC; Future  -     Ferritin; Future          Toi Anderson DO 07/18/25 11:24 AM     Doing annual preventative and Medicare wellness today including annual lab review:    ----    Screening for prostate cancer.  PSA 0.76, was 0.73, totally normal.  Will check annually.    Immunization counseling: Up-to-date on all immunizations except is due  for the second Shingrix Shingrix shot.  2021 was last Tdap.  -->>  Continue to follow-up with your pharmacy and keep up-to-date on immunizations.      -----      New annual labs reviewed:    - Hyperlipidemia, well-controlled on Crestor/rosuvastatin, prescribed by cardiology. -->> Follow-up as planned.  Also recommend you start coq10, 200 mg daily.  Buy the cheapest you can find it is totally good enough. Should be able to find it for 15 - 20 cents per pill. [employer] has $0.11 a pill, $20 for 180 pills.    - Macrocytosis with hypochromic erythrocytes without anemia. -->>  Will plan to check anemia panel with next annual labs.    - Vitamin D deficiency. 53, was 47, 51, 55, 49, 42, 43, 31 before that. Goal is . Is on vitamin D, not sure of the dose.  -->>  Double your dose of vitamin D what ever it is and will recheck with next annual labs.       - Drug therapy, screening for condition. A1c is 5.5, was 5.2, 5.2, 5.5, so no diabetes.  TSH within normal limits, so no thyroid problems noted.-->> will repeat annual labs around jul 2023       Coronary artery disease, bundle branch block, no syncopal episodes since getting pacemaker. Doing well.  Follow-up with cardiology/Dr. Menjivar and Dr. Aguiar.    Seborrheic keratosis, screening for skin cancer. -->> again recommend you follow-up with dermatology.    - Will schedule Medicare wellness visit with annual preventative and annual lab review in about 12 months.  - Patient will come in about a week before the appointment to get fasting annual labs including a PSA's screening.

## 2025-07-29 ENCOUNTER — APPOINTMENT (OUTPATIENT)
Dept: CARDIOLOGY | Facility: CLINIC | Age: 85
End: 2025-07-29
Payer: MEDICARE

## 2025-07-29 VITALS
HEART RATE: 66 BPM | WEIGHT: 173 LBS | BODY MASS INDEX: 27.15 KG/M2 | DIASTOLIC BLOOD PRESSURE: 70 MMHG | HEIGHT: 67 IN | SYSTOLIC BLOOD PRESSURE: 124 MMHG

## 2025-07-29 DIAGNOSIS — I25.10 CORONARY ARTERY DISEASE INVOLVING NATIVE CORONARY ARTERY OF NATIVE HEART WITHOUT ANGINA PECTORIS: ICD-10-CM

## 2025-07-29 DIAGNOSIS — I25.709: ICD-10-CM

## 2025-07-29 DIAGNOSIS — E78.5 DYSLIPIDEMIA: ICD-10-CM

## 2025-07-29 DIAGNOSIS — E78.2 MIXED HYPERLIPIDEMIA: ICD-10-CM

## 2025-07-29 DIAGNOSIS — Z78.9 NEVER SMOKED CIGARETTES: ICD-10-CM

## 2025-07-29 PROCEDURE — 3078F DIAST BP <80 MM HG: CPT | Performed by: INTERNAL MEDICINE

## 2025-07-29 PROCEDURE — 1036F TOBACCO NON-USER: CPT | Performed by: INTERNAL MEDICINE

## 2025-07-29 PROCEDURE — 1159F MED LIST DOCD IN RCRD: CPT | Performed by: INTERNAL MEDICINE

## 2025-07-29 PROCEDURE — 3074F SYST BP LT 130 MM HG: CPT | Performed by: INTERNAL MEDICINE

## 2025-07-29 PROCEDURE — 99214 OFFICE O/P EST MOD 30 MIN: CPT | Performed by: INTERNAL MEDICINE

## 2025-07-29 NOTE — PATIENT INSTRUCTIONS
Continue same medications/treatment.  Patient educated on proper medication use.  Patient educated on risk factor modification.  Please bring any lab results from other providers/physicians to your next appointment.    Please bring all medicines, vitamins, and herbal supplements with you when you come to the office.    Prescriptions will not be filled unless you are compliant with your follow up appointments or have a follow up appointment scheduled as per instruction of your physician. Refills should be requested at the time of your visit.    Follow up in 6 months   ECHO and Stress test to be done prior to your next office visit    ITERRY RN, AM SCRIBING FOR AND IN THE PRESENCE OF DR. CARLOS EM, DO, FACC

## 2025-07-29 NOTE — PROGRESS NOTES
Patient:  Jones Avila  YOB: 1940  MRN: [id number]     Chief complaint:   Chief Complaint   Patient presents with    Follow-up     6 month visit. CAD,CABG        HPI:       Jones Avila is a 84 y.o. male who returns today for cardiac follow-up..  Patient denies any chest pain shortness of breath or other clinical complaints.  Patient follows with the patient clinic as well and Dr. Denton because of his pacemaker.  He is currently has coronary disease prior coronary bypass surgery permanent pacemaker dyslipidemia hypertension hyperlipidemia sick sinus syndrome.  He is never smoker.  He says he feels perfectly fine.  Exam is normal.  Vital signs are also normal.      Objective:     Vitals:    07/29/25 1211   BP: 124/70   Pulse: 66       Wt Readings from Last 4 Encounters:   07/29/25 78.5 kg (173 lb)   07/18/25 80.3 kg (177 lb)   04/29/25 75.8 kg (167 lb)   01/28/25 79.2 kg (174 lb 9.6 oz)       Allergies:     Allergies[1]     Medications:     Current Outpatient Medications   Medication Instructions    aspirin 81 mg EC tablet 1 tablet, Daily    famotidine (PEPCID) 20 mg, oral, 2 times daily, if needed for BREAKTHROUGH HEARTBURN    metoprolol tartrate (Lopressor) 50 mg tablet 1 po qd    olmesartan (BENICAR) 20 mg, oral, Daily    rosuvastatin (CRESTOR) 20 mg, oral, Daily    sildenafil (Viagra) 50 mg tablet 0.5-2 tablets       Physical Examination:     Constitutional:       Appearance: Healthy appearance. Not in distress.   Neck:      Vascular: No JVR. JVD normal.   Pulmonary:      Effort: Pulmonary effort is normal.      Breath sounds: Normal breath sounds. No wheezing. No rhonchi. No rales.   Chest:      Chest wall: Not tender to palpatation.   Cardiovascular:      PMI at left midclavicular line. Normal rate. Regular rhythm. Normal S1. Normal S2.       Murmurs: There is no murmur.      No gallop.  No click. No rub.   Pulses:     Intact distal pulses.   Edema:     Peripheral edema absent.   Abdominal:       "General: Bowel sounds are normal.      Palpations: Abdomen is soft.      Tenderness: There is no abdominal tenderness.   Musculoskeletal: Normal range of motion.         General: No tenderness. Skin:     General: Skin is warm and dry.   Neurological:      General: No focal deficit present.      Mental Status: Alert and oriented to person, place and time.        Lab:     CBC:   Lab Results   Component Value Date    WBC 8.7 07/15/2025    RBC 4.19 (L) 07/15/2025    HGB 13.8 07/15/2025    HCT 43.3 07/15/2025     (L) 07/15/2025        CMP:    Lab Results   Component Value Date     07/15/2025    K 4.6 07/15/2025     07/15/2025    CO2 30 07/15/2025    BUN 15 07/15/2025    CREATININE 0.98 07/15/2025    GLUCOSE 97 07/15/2025    CALCIUM 8.8 07/15/2025       Magnesium:    Lab Results   Component Value Date    MG 2.3 07/15/2025       Lipid Profile:    Lab Results   Component Value Date    TRIG 89 07/15/2025    HDL 56 07/15/2025    LDLCALC 78 07/15/2025       TSH:    Lab Results   Component Value Date    TSH 1.49 07/15/2025       BNP:   No results found for: \"BNP\"     PT/INR:    Lab Results   Component Value Date    PROTIME 13.3 (H) 07/09/2023    INR 1.2 (H) 07/09/2023       HgBA1c:    Lab Results   Component Value Date    HGBA1C 5.5 07/15/2025       BMP:  Lab Results   Component Value Date     07/15/2025     07/02/2024     07/13/2023     07/12/2023     07/11/2023    K 4.6 07/15/2025    K 4.3 07/02/2024    K 4.1 07/13/2023    K 4.0 07/12/2023    K 3.8 07/11/2023     07/15/2025     07/02/2024     07/13/2023     07/12/2023     07/11/2023    CO2 30 07/15/2025    CO2 29 07/02/2024    CO2 26 07/13/2023    CO2 27 07/12/2023    CO2 27 07/11/2023    BUN 15 07/15/2025    BUN 16 07/02/2024    BUN 12 07/13/2023    BUN 16 07/12/2023    BUN 14 07/11/2023    CREATININE 0.98 07/15/2025    CREATININE 1.12 07/02/2024    CREATININE 0.87 07/13/2023    CREATININE 0.98 " 07/12/2023    CREATININE 1.01 07/11/2023       CBC:  Lab Results   Component Value Date    WBC 8.7 07/15/2025    WBC 8.3 07/02/2024    WBC 11.7 (H) 07/13/2023    WBC 10.9 07/12/2023    WBC 9.9 07/11/2023    RBC 4.19 (L) 07/15/2025    RBC 4.36 (L) 07/02/2024    RBC 4.15 (L) 07/13/2023    RBC 4.36 (L) 07/12/2023    RBC 4.19 (L) 07/11/2023    HGB 13.8 07/15/2025    HGB 14.4 07/02/2024    HGB 13.5 07/13/2023    HGB 14.1 07/12/2023    HGB 13.7 07/11/2023    HCT 43.3 07/15/2025    HCT 43.2 07/02/2024    HCT 38.8 (L) 07/13/2023    HCT 41.4 07/12/2023    HCT 39.7 (L) 07/11/2023    .3 (H) 07/15/2025    MCV 99 07/02/2024    MCV 93 07/13/2023    MCV 95 07/12/2023    MCV 95 07/11/2023    MCH 32.9 07/15/2025    MCH 33.0 07/02/2024    MCHC 31.9 (L) 07/15/2025    MCHC 33.3 07/02/2024    MCHC 34.8 07/13/2023    MCHC 34.1 07/12/2023    MCHC 34.5 07/11/2023    RDW 13.5 07/15/2025    RDW 13.2 07/02/2024    RDW 12.8 07/13/2023    RDW 12.9 07/12/2023    RDW 12.9 07/11/2023     (L) 07/15/2025     07/02/2024     07/13/2023     07/12/2023     07/11/2023    MPV 11.2 07/15/2025       Cardiac Enzymes:    Lab Results   Component Value Date    TROPHS 10 07/09/2023    TROPHS 10 07/09/2023       Hepatic Function Panel:    Lab Results   Component Value Date    ALKPHOS 59 07/15/2025    ALT 14 07/15/2025    AST 21 07/15/2025    PROT 6.5 07/15/2025    BILITOT 1.1 07/15/2025    BILIDIR 0.2 07/09/2023       Diagnostic Studies:     ECG 12 lead (Clinic Performed)  Result Date: 4/29/2025  See scan      Radiology:     Transthoracic Echo (TTE) Complete    (Results Pending)   Nuclear Stress Test    (Results Pending)       Problem List:     Patient Active Problem List   Diagnosis    Asymptomatic carotid artery stenosis    Atherosclerosis of CABG, unsp, w unsp angina pectoris    CAD (coronary artery disease)    Known medical problems    Altered mental status    Cardiac pacemaker    Dyslipidemia    Erectile dysfunction     GERD (gastroesophageal reflux disease)    HTN (hypertension)    Syncope, near    Hyperlipidemia    Malignant essential hypertension    BMI 29.0-29.9,adult    Syncope    Urinary incontinence    Vitamin D deficiency    Complete heart block    Drug therapy    Never smoked cigarettes    Encounter for medication review and counseling    Encounter to discuss treatment options    BMI 27.0-27.9,adult    Screening for prostate cancer    Screening for condition    Preventative health care    Seborrheic keratoses    Chronic left shoulder pain    BMI 26.0-26.9,adult       Asessment:       84-year-old gentleman here for routine follow-up.    Meds, vitals, examination as noted.    Chart review details cussed the patient and his wife.    Impression:  ASHD class II  Remote coronary artery bypass surgery  Sick sinus syndrome  Permanent pacemaker  Hypertension controlled  Dyslipidemia  GERD  Non-smoker  Plan:      recommendation:  Continue usual meds  See me in 6 months  Prior to next visit obtain nuclear stress test and echo  Call if any problems or issues otherwise arise  Usual follow-up with pacing clinic      IAlta RN  am scribing for, and in the presence of Dr. Robert Menjivar DO .    I, Dr. Robert Menjivar DO , personally performed the services described in the documentation as scribed by Alta Newman RN  in my presence, and confirm it is both accurate and complete.    Dr. Robert Menjivar DO  Thank  you for allowing me to participate in this patients care, please contact my office with questions.          [1] No Known Allergies

## 2025-08-05 ENCOUNTER — HOSPITAL ENCOUNTER (OUTPATIENT)
Dept: CARDIOLOGY | Facility: HOSPITAL | Age: 85
Discharge: HOME | End: 2025-08-05
Payer: MEDICARE

## 2025-08-05 DIAGNOSIS — Z95.0 PACEMAKER: ICD-10-CM

## 2025-08-05 PROCEDURE — 93296 REM INTERROG EVL PM/IDS: CPT

## 2025-08-20 ENCOUNTER — APPOINTMENT (OUTPATIENT)
Dept: GENERAL RADIOLOGY | Age: 85
End: 2025-08-20
Payer: MEDICARE

## 2025-08-20 ENCOUNTER — HOSPITAL ENCOUNTER (EMERGENCY)
Age: 85
Discharge: HOME | End: 2025-08-20
Payer: MEDICARE

## 2025-08-20 ENCOUNTER — APPOINTMENT (OUTPATIENT)
Dept: CT IMAGING | Age: 85
End: 2025-08-20
Payer: MEDICARE

## 2025-08-20 VITALS
DIASTOLIC BLOOD PRESSURE: 67 MMHG | SYSTOLIC BLOOD PRESSURE: 122 MMHG | HEIGHT: 67 IN | TEMPERATURE: 97.9 F | OXYGEN SATURATION: 97 % | HEART RATE: 86 BPM | WEIGHT: 168 LBS | RESPIRATION RATE: 20 BRPM | BODY MASS INDEX: 26.37 KG/M2

## 2025-08-20 DIAGNOSIS — D72.829 LEUKOCYTOSIS, UNSPECIFIED TYPE: ICD-10-CM

## 2025-08-20 DIAGNOSIS — D69.6 LOW PLATELET COUNT: ICD-10-CM

## 2025-08-20 DIAGNOSIS — R41.82 ALTERED MENTAL STATUS, UNSPECIFIED ALTERED MENTAL STATUS TYPE: Primary | ICD-10-CM

## 2025-08-20 DIAGNOSIS — R79.89 ELEVATED TROPONIN: ICD-10-CM

## 2025-08-20 LAB
ALBUMIN SERPL-MCNC: 3.7 G/DL (ref 3.5–4.6)
ALP SERPL-CCNC: 86 U/L (ref 35–104)
ALT SERPL-CCNC: 52 U/L (ref 0–41)
AMMONIA PLAS-SCNC: 17 UMOL/L (ref 16–60)
AMPHET UR QL SCN: NORMAL
ANION GAP SERPL CALCULATED.3IONS-SCNC: 11 MEQ/L (ref 9–15)
APTT PPP: 33.5 SEC (ref 24.4–36.8)
AST SERPL-CCNC: 64 U/L (ref 0–40)
BARBITURATES UR QL SCN: NORMAL
BASE EXCESS VENOUS: 5 (ref -3–3)
BASOPHILS # BLD: 0 K/UL (ref 0–0.2)
BASOPHILS NFR BLD: 0.3 %
BENZODIAZ UR QL SCN: NORMAL
BILIRUB SERPL-MCNC: 1 MG/DL (ref 0.2–0.7)
BILIRUB UR QL STRIP: NEGATIVE
BNP BLD-MCNC: 682 PG/ML
BUN SERPL-MCNC: 17 MG/DL (ref 8–23)
CALCIUM IONIZED: 1.23 MMOL/L (ref 1.12–1.32)
CALCIUM SERPL-MCNC: 8.7 MG/DL (ref 8.5–9.9)
CANNABINOIDS UR QL SCN: NORMAL
CHLORIDE SERPL-SCNC: 101 MEQ/L (ref 95–107)
CLARITY UR: CLEAR
CO2 SERPL-SCNC: 26 MEQ/L (ref 20–31)
COCAINE UR QL SCN: NORMAL
COLOR UR: YELLOW
CREAT SERPL-MCNC: 0.98 MG/DL (ref 0.7–1.2)
DRUG SCREEN COMMENT UR-IMP: NORMAL
EOSINOPHIL # BLD: 0 K/UL (ref 0–0.7)
EOSINOPHIL NFR BLD: 0.3 %
ERYTHROCYTE [DISTWIDTH] IN BLOOD BY AUTOMATED COUNT: 12.8 % (ref 11.5–14.5)
ETHANOL PERCENT: NORMAL G/DL
ETHANOLAMINE SERPL-MCNC: <10 MG/DL (ref 0–0.08)
FENTANYL SCREEN, URINE: NORMAL
GLOBULIN SER CALC-MCNC: 3 G/DL (ref 2.3–3.5)
GLUCOSE BLD-MCNC: 118 MG/DL (ref 70–99)
GLUCOSE SERPL-MCNC: 110 MG/DL (ref 70–99)
GLUCOSE UR STRIP-MCNC: NEGATIVE MG/DL
HCO3 VENOUS: 28.9 MMOL/L (ref 23–29)
HCT VFR BLD AUTO: 41.6 % (ref 42–52)
HCT VFR BLD AUTO: 48 % (ref 41–53)
HGB BLD CALC-MCNC: 16.3 GM/DL (ref 13.5–17.5)
HGB BLD-MCNC: 14.3 G/DL (ref 14–18)
HGB UR QL STRIP: NEGATIVE
INR PPP: 1.2
KETONES UR STRIP-MCNC: 15 MG/DL
LACTATE BLDV-SCNC: 1.4 MMOL/L (ref 0.5–2.2)
LACTATE: 2 MMOL/L (ref 0.4–2)
LEUKOCYTE ESTERASE UR QL STRIP: NEGATIVE
LIPASE SERPL-CCNC: 26 U/L (ref 12–95)
LYMPHOCYTES # BLD: 1.2 K/UL (ref 1–4.8)
LYMPHOCYTES NFR BLD: 7 %
MCH RBC QN AUTO: 33.7 PG (ref 27–31.3)
MCHC RBC AUTO-ENTMCNC: 34.4 % (ref 33–37)
MCV RBC AUTO: 98.1 FL (ref 79–92.2)
METHADONE UR QL SCN: NORMAL
MONOCYTES # BLD: 0.7 K/UL (ref 0.2–0.8)
MONOCYTES NFR BLD: 4 %
NEUTROPHILS # BLD: 15.3 K/UL (ref 1.4–6.5)
NEUTS SEG NFR BLD: 89 %
NITRITE UR QL STRIP: NEGATIVE
OPIATES UR QL SCN: NORMAL
OXYCODONE UR QL SCN: NORMAL
PCO2 VENOUS: 42.9 MM HG (ref 40–50)
PCP UR QL SCN: NORMAL
PERFORMED ON: ABNORMAL
PH UR STRIP: 5.5 [PH] (ref 5–9)
PH VENOUS: 7.44 (ref 7.32–7.42)
PLATELET # BLD AUTO: 68 K/UL (ref 130–400)
PLATELET BLD QL SMEAR: ABNORMAL
PO2 VENOUS: <22 MM HG
POC CHLORIDE: 104 MEQ/L (ref 99–110)
POC CREATININE: 1 MG/DL (ref 0.8–1.3)
POC FIO2: 21
POC SAMPLE TYPE: ABNORMAL
POTASSIUM SERPL-SCNC: 4.1 MEQ/L (ref 3.5–5.1)
POTASSIUM SERPL-SCNC: 4.2 MEQ/L (ref 3.4–4.9)
PROPOXYPH UR QL SCN: NORMAL
PROT SERPL-MCNC: 6.7 G/DL (ref 6.3–8)
PROT UR STRIP-MCNC: ABNORMAL MG/DL
PROTHROMBIN TIME: 15.9 SEC (ref 12.3–14.9)
RBC # BLD AUTO: 4.24 M/UL (ref 4.7–6.1)
REASON FOR REJECTION: NORMAL
REJECTED TEST: NORMAL
SLIDE REVIEW: ABNORMAL
SODIUM BLD-SCNC: 140 MEQ/L (ref 136–145)
SODIUM SERPL-SCNC: 138 MEQ/L (ref 135–144)
SP GR UR STRIP: 1.03 (ref 1–1.03)
TCO2 CALC VENOUS: 30 MMOL/L
TROPONIN, HIGH SENSITIVITY: 29 NG/L (ref 0–19)
TROPONIN, HIGH SENSITIVITY: 34 NG/L (ref 0–19)
URINE REFLEX TO CULTURE: ABNORMAL
UROBILINOGEN UR STRIP-ACNC: 1 E.U./DL
WBC # BLD AUTO: 17.2 K/UL (ref 4.8–10.8)

## 2025-08-20 PROCEDURE — 85730 THROMBOPLASTIN TIME PARTIAL: CPT

## 2025-08-20 PROCEDURE — 84484 ASSAY OF TROPONIN QUANT: CPT

## 2025-08-20 PROCEDURE — 87040 BLOOD CULTURE FOR BACTERIA: CPT

## 2025-08-20 PROCEDURE — 82565 ASSAY OF CREATININE: CPT

## 2025-08-20 PROCEDURE — 93005 ELECTROCARDIOGRAM TRACING: CPT

## 2025-08-20 PROCEDURE — 83880 ASSAY OF NATRIURETIC PEPTIDE: CPT

## 2025-08-20 PROCEDURE — 36415 COLL VENOUS BLD VENIPUNCTURE: CPT

## 2025-08-20 PROCEDURE — 85610 PROTHROMBIN TIME: CPT

## 2025-08-20 PROCEDURE — 71045 X-RAY EXAM CHEST 1 VIEW: CPT

## 2025-08-20 PROCEDURE — 84295 ASSAY OF SERUM SODIUM: CPT

## 2025-08-20 PROCEDURE — 70450 CT HEAD/BRAIN W/O DYE: CPT

## 2025-08-20 PROCEDURE — 82330 ASSAY OF CALCIUM: CPT

## 2025-08-20 PROCEDURE — 99285 EMERGENCY DEPT VISIT HI MDM: CPT

## 2025-08-20 PROCEDURE — 82435 ASSAY OF BLOOD CHLORIDE: CPT

## 2025-08-20 PROCEDURE — 81003 URINALYSIS AUTO W/O SCOPE: CPT

## 2025-08-20 PROCEDURE — 85014 HEMATOCRIT: CPT

## 2025-08-20 PROCEDURE — 83605 ASSAY OF LACTIC ACID: CPT

## 2025-08-20 PROCEDURE — 83690 ASSAY OF LIPASE: CPT

## 2025-08-20 PROCEDURE — 85025 COMPLETE CBC W/AUTO DIFF WBC: CPT

## 2025-08-20 PROCEDURE — 80053 COMPREHEN METABOLIC PANEL: CPT

## 2025-08-20 PROCEDURE — 87150 DNA/RNA AMPLIFIED PROBE: CPT

## 2025-08-20 PROCEDURE — 82800 BLOOD PH: CPT

## 2025-08-20 PROCEDURE — 51798 US URINE CAPACITY MEASURE: CPT

## 2025-08-20 PROCEDURE — 80307 DRUG TEST PRSMV CHEM ANLYZR: CPT

## 2025-08-20 PROCEDURE — 36600 WITHDRAWAL OF ARTERIAL BLOOD: CPT

## 2025-08-20 PROCEDURE — 84132 ASSAY OF SERUM POTASSIUM: CPT

## 2025-08-20 PROCEDURE — 2580000003 HC RX 258

## 2025-08-20 PROCEDURE — 82140 ASSAY OF AMMONIA: CPT

## 2025-08-20 PROCEDURE — 82077 ASSAY SPEC XCP UR&BREATH IA: CPT

## 2025-08-20 RX ORDER — 0.9 % SODIUM CHLORIDE 0.9 %
250 INTRAVENOUS SOLUTION INTRAVENOUS ONCE
Status: COMPLETED | OUTPATIENT
Start: 2025-08-20 | End: 2025-08-20

## 2025-08-20 RX ORDER — ASPIRIN 81 MG/1
324 TABLET, CHEWABLE ORAL ONCE
Status: DISCONTINUED | OUTPATIENT
Start: 2025-08-20 | End: 2025-08-20

## 2025-08-20 RX ADMIN — SODIUM CHLORIDE 250 ML: 0.9 INJECTION, SOLUTION INTRAVENOUS at 20:01

## 2025-08-20 ASSESSMENT — PAIN SCALES - GENERAL: PAINLEVEL_OUTOF10: 0

## 2025-08-20 ASSESSMENT — PAIN - FUNCTIONAL ASSESSMENT: PAIN_FUNCTIONAL_ASSESSMENT: 0-10

## 2025-08-21 ENCOUNTER — APPOINTMENT (OUTPATIENT)
Dept: RADIOLOGY | Facility: HOSPITAL | Age: 85
DRG: 072 | End: 2025-08-21
Payer: MEDICARE

## 2025-08-21 ENCOUNTER — HOSPITAL ENCOUNTER (INPATIENT)
Facility: HOSPITAL | Age: 85
LOS: 2 days | Discharge: HOME | DRG: 072 | End: 2025-08-23
Attending: EMERGENCY MEDICINE | Admitting: STUDENT IN AN ORGANIZED HEALTH CARE EDUCATION/TRAINING PROGRAM
Payer: MEDICARE

## 2025-08-21 ENCOUNTER — OFFICE VISIT (OUTPATIENT)
Dept: PRIMARY CARE | Facility: CLINIC | Age: 85
End: 2025-08-21
Payer: MEDICARE

## 2025-08-21 VITALS
BODY MASS INDEX: 27 KG/M2 | HEIGHT: 67 IN | TEMPERATURE: 97.7 F | OXYGEN SATURATION: 96 % | SYSTOLIC BLOOD PRESSURE: 93 MMHG | HEART RATE: 81 BPM | RESPIRATION RATE: 16 BRPM | DIASTOLIC BLOOD PRESSURE: 54 MMHG | WEIGHT: 172 LBS

## 2025-08-21 DIAGNOSIS — R78.81 POSITIVE BLOOD CULTURE: ICD-10-CM

## 2025-08-21 DIAGNOSIS — D72.829 LEUKOCYTOSIS, UNSPECIFIED TYPE: ICD-10-CM

## 2025-08-21 DIAGNOSIS — A49.1 STREPTOCOCCUS VIRIDANS INFECTION: ICD-10-CM

## 2025-08-21 DIAGNOSIS — D69.6 THROMBOCYTOPENIA: Primary | ICD-10-CM

## 2025-08-21 DIAGNOSIS — T14.8XXA EXCORIATION: ICD-10-CM

## 2025-08-21 DIAGNOSIS — R41.0 DELIRIUM: Primary | ICD-10-CM

## 2025-08-21 DIAGNOSIS — R41.82 ALTERED MENTAL STATUS, UNSPECIFIED ALTERED MENTAL STATUS TYPE: ICD-10-CM

## 2025-08-21 LAB
A BAUMANNII DNA BLD POS QL NAA+NON-PROBE: NOT DETECTED
ALBUMIN SERPL BCP-MCNC: 4 G/DL (ref 3.4–5)
ALP SERPL-CCNC: 80 U/L (ref 33–136)
ALT SERPL W P-5'-P-CCNC: 48 U/L (ref 10–52)
ANION GAP BLDV CALCULATED.4IONS-SCNC: 3 MMOL/L (ref 10–25)
ANION GAP SERPL CALC-SCNC: 12 MMOL/L (ref 10–20)
APPEARANCE UR: CLEAR
APTT PPP: 24 SECONDS (ref 26–36)
AST SERPL W P-5'-P-CCNC: 51 U/L (ref 9–39)
BACTERIA BLD CULT ORG #2: ABNORMAL
BACTERIA BLD CULT: NORMAL
BASE EXCESS BLDV CALC-SCNC: 4.7 MMOL/L (ref -2–3)
BASOPHILS # BLD MANUAL: 0.12 X10*3/UL (ref 0–0.1)
BASOPHILS NFR BLD MANUAL: 1 %
BILIRUB SERPL-MCNC: 0.9 MG/DL (ref 0–1.2)
BILIRUB UR STRIP.AUTO-MCNC: NEGATIVE MG/DL
BODY TEMPERATURE: ABNORMAL
BUN SERPL-MCNC: 19 MG/DL (ref 6–23)
C ALBICANS DNA BLD POS QL NAA+NON-PROBE: NOT DETECTED
C AURIS DNA BLD POS QL NAA+PROBE: NOT DETECTED
C GLABRATA DNA BLD POS QL NAA+NON-PROBE: NOT DETECTED
C KRUSEI DNA BLD POS QL NAA+NON-PROBE: NOT DETECTED
C PARAP DNA BLD POS QL NAA+NON-PROBE: NOT DETECTED
C TROPICLS DNA BLD POS QL NAA+NON-PROBE: NOT DETECTED
CA-I BLDV-SCNC: 1.19 MMOL/L (ref 1.1–1.33)
CALCIUM SERPL-MCNC: 9.2 MG/DL (ref 8.6–10.3)
CHLORIDE BLDV-SCNC: 101 MMOL/L (ref 98–107)
CHLORIDE SERPL-SCNC: 100 MMOL/L (ref 98–107)
CO2 SERPL-SCNC: 27 MMOL/L (ref 21–32)
COLOR UR: YELLOW
CREAT SERPL-MCNC: 1.04 MG/DL (ref 0.5–1.3)
CRYPTOCOCCUS NEOFORMANS/GATTII BY PCR: NOT DETECTED
E CLOAC COMP DNA BLD POS NAA+NON-PROBE: NOT DETECTED
E COLI DNA BLD POS QL NAA+NON-PROBE: NOT DETECTED
E FAECALIS DNA BLD POS QL NAA+PROBE: NOT DETECTED
E FAECIUM DNA BLD POS QL NAA+PROBE: NOT DETECTED
EGFRCR SERPLBLD CKD-EPI 2021: 71 ML/MIN/1.73M*2
EKG ATRIAL RATE: 95 BPM
EKG DIAGNOSIS: NORMAL
EKG P AXIS: 13 DEGREES
EKG P-R INTERVAL: 222 MS
EKG Q-T INTERVAL: 372 MS
EKG QRS DURATION: 122 MS
EKG QTC CALCULATION (BAZETT): 467 MS
EKG R AXIS: -62 DEGREES
EKG T AXIS: 78 DEGREES
EKG VENTRICULAR RATE: 95 BPM
ENTEROBACT DNA BLD POS QL NAA+NON-PROBE: NOT DETECTED
ENTEROCOC DNA BLD POS QL NAA+NON-PROBE: NOT DETECTED
EOSINOPHIL # BLD MANUAL: 0.12 X10*3/UL (ref 0–0.4)
EOSINOPHIL NFR BLD MANUAL: 1 %
ERYTHROCYTE [DISTWIDTH] IN BLOOD BY AUTOMATED COUNT: 13 % (ref 11.5–14.5)
GLUCOSE BLDV-MCNC: 114 MG/DL (ref 74–99)
GLUCOSE SERPL-MCNC: 104 MG/DL (ref 74–99)
GLUCOSE UR STRIP.AUTO-MCNC: NORMAL MG/DL
GN BLD CULTURE PNL BLD POS NAA+PROBE: NOT DETECTED
GP B STREP DNA BLD POS QL NAA+NON-PROBE: NOT DETECTED
HCO3 BLDV-SCNC: 31 MMOL/L (ref 22–26)
HCT VFR BLD AUTO: 41.1 % (ref 41–52)
HCT VFR BLD EST: 53 % (ref 41–52)
HGB BLD-MCNC: 14 G/DL (ref 13.5–17.5)
HGB BLDV-MCNC: 17.8 G/DL (ref 13.5–17.5)
IMM GRANULOCYTES # BLD AUTO: 0.08 X10*3/UL (ref 0–0.5)
IMM GRANULOCYTES NFR BLD AUTO: 0.6 % (ref 0–0.9)
INHALED O2 CONCENTRATION: 21 %
INR PPP: 1.3 (ref 0.9–1.1)
K OXYTOCA DNA BLD POS QL NAA+NON-PROBE: NOT DETECTED
K PNEUMON DNA SPEC QL NAA+PROBE: NOT DETECTED
K. AEROGENES DNA SPEC QL NAA+PROBE: NOT DETECTED
KETONES UR STRIP.AUTO-MCNC: NEGATIVE MG/DL
L MONOCYTOG DNA BLD POS QL NAA+NON-PROBE: NOT DETECTED
LACTATE BLDV-SCNC: 1.4 MMOL/L (ref 0.4–2)
LACTATE SERPL-SCNC: 1.1 MMOL/L (ref 0.4–2)
LEUKOCYTE ESTERASE UR QL STRIP.AUTO: NEGATIVE
LYMPHOCYTES # BLD MANUAL: 1.86 X10*3/UL (ref 0.8–3)
LYMPHOCYTES NFR BLD MANUAL: 15 %
MCH RBC QN AUTO: 32.9 PG (ref 26–34)
MCHC RBC AUTO-ENTMCNC: 34.1 G/DL (ref 32–36)
MCV RBC AUTO: 97 FL (ref 80–100)
MONOCYTES # BLD MANUAL: 0.99 X10*3/UL (ref 0.05–0.8)
MONOCYTES NFR BLD MANUAL: 8 %
MUCOUS THREADS #/AREA URNS AUTO: NORMAL /LPF
N MEN DNA BLD POS QL NAA+NON-PROBE: NOT DETECTED
NEUTS SEG # BLD MANUAL: 9.3 X10*3/UL (ref 1.6–5)
NEUTS SEG NFR BLD MANUAL: 75 %
NITRITE UR QL STRIP.AUTO: NEGATIVE
NRBC BLD-RTO: 0 /100 WBCS (ref 0–0)
OXYHGB MFR BLDV: 17.7 % (ref 45–75)
P AERUGINOSA DNA BLD POS NAA+NON-PROBE: NOT DETECTED
PCO2 BLDV: 50 MM HG (ref 41–51)
PH BLDV: 7.4 PH (ref 7.33–7.43)
PH UR STRIP.AUTO: 5.5 [PH]
PLATELET # BLD AUTO: 81 X10*3/UL (ref 150–450)
PO2 BLDV: 16 MM HG (ref 35–45)
POTASSIUM BLDV-SCNC: 4.1 MMOL/L (ref 3.5–5.3)
POTASSIUM SERPL-SCNC: 4 MMOL/L (ref 3.5–5.3)
PROT SERPL-MCNC: 7.6 G/DL (ref 6.4–8.2)
PROT UR STRIP.AUTO-MCNC: ABNORMAL MG/DL
PROTEUS SP DNA BLD POS QL NAA+NON-PROBE: NOT DETECTED
PROTHROMBIN TIME: 14.1 SECONDS (ref 9.8–12.4)
RBC # BLD AUTO: 4.25 X10*6/UL (ref 4.5–5.9)
RBC # UR STRIP.AUTO: NEGATIVE MG/DL
RBC #/AREA URNS AUTO: NORMAL /HPF
RBC MORPH BLD: ABNORMAL
S AUREUS DNA BLD POS QL NAA+NON-PROBE: NOT DETECTED
S AUREUS+CONS DNA BLD POS NAA+NON-PROBE: NOT DETECTED
S EPIDERMIDIS DNA BLD POS QL NAA+PROBE: NOT DETECTED
S LUGDUNENSIS DNA BLD POS QL NAA+PROBE: NOT DETECTED
S MALTOPH DNA BLD POS QL NAA+PROBE: NOT DETECTED
S MARCESCENS DNA BLD POS NAA+NON-PROBE: NOT DETECTED
S PNEUM DNA BLD POS QL NAA+NON-PROBE: NOT DETECTED
S PYO DNA BLD POS QL NAA+NON-PROBE: NOT DETECTED
SALMONELLA DNA BLD POS QL NAA+PROBE: NOT DETECTED
SAO2 % BLDV: 18 % (ref 45–75)
SODIUM BLDV-SCNC: 131 MMOL/L (ref 136–145)
SODIUM SERPL-SCNC: 135 MMOL/L (ref 136–145)
SP GR UR STRIP.AUTO: 1.03
STREPTOCOCCUS DNA BLD POS NAA+NON-PROBE: DETECTED
TOTAL CELLS COUNTED BLD: 100
UROBILINOGEN UR STRIP.AUTO-MCNC: ABNORMAL MG/DL
WBC # BLD AUTO: 12.4 X10*3/UL (ref 4.4–11.3)
WBC #/AREA URNS AUTO: NORMAL /HPF

## 2025-08-21 PROCEDURE — 1126F AMNT PAIN NOTED NONE PRSNT: CPT | Performed by: FAMILY MEDICINE

## 2025-08-21 PROCEDURE — 1210000001 HC SEMI-PRIVATE ROOM DAILY

## 2025-08-21 PROCEDURE — 2500000004 HC RX 250 GENERAL PHARMACY W/ HCPCS (ALT 636 FOR OP/ED): Performed by: EMERGENCY MEDICINE

## 2025-08-21 PROCEDURE — 96367 TX/PROPH/DG ADDL SEQ IV INF: CPT

## 2025-08-21 PROCEDURE — 71045 X-RAY EXAM CHEST 1 VIEW: CPT

## 2025-08-21 PROCEDURE — 3074F SYST BP LT 130 MM HG: CPT | Performed by: FAMILY MEDICINE

## 2025-08-21 PROCEDURE — 74177 CT ABD & PELVIS W/CONTRAST: CPT

## 2025-08-21 PROCEDURE — 2550000001 HC RX 255 CONTRASTS: Performed by: EMERGENCY MEDICINE

## 2025-08-21 PROCEDURE — 71260 CT THORAX DX C+: CPT | Performed by: RADIOLOGY

## 2025-08-21 PROCEDURE — 3078F DIAST BP <80 MM HG: CPT | Performed by: FAMILY MEDICINE

## 2025-08-21 PROCEDURE — 84075 ASSAY ALKALINE PHOSPHATASE: CPT | Performed by: EMERGENCY MEDICINE

## 2025-08-21 PROCEDURE — 85610 PROTHROMBIN TIME: CPT | Performed by: EMERGENCY MEDICINE

## 2025-08-21 PROCEDURE — 36415 COLL VENOUS BLD VENIPUNCTURE: CPT | Performed by: EMERGENCY MEDICINE

## 2025-08-21 PROCEDURE — 99285 EMERGENCY DEPT VISIT HI MDM: CPT | Mod: 25 | Performed by: EMERGENCY MEDICINE

## 2025-08-21 PROCEDURE — 85027 COMPLETE CBC AUTOMATED: CPT | Performed by: EMERGENCY MEDICINE

## 2025-08-21 PROCEDURE — 96366 THER/PROPH/DIAG IV INF ADDON: CPT

## 2025-08-21 PROCEDURE — 71045 X-RAY EXAM CHEST 1 VIEW: CPT | Mod: FOREIGN READ | Performed by: RADIOLOGY

## 2025-08-21 PROCEDURE — 81001 URINALYSIS AUTO W/SCOPE: CPT | Performed by: EMERGENCY MEDICINE

## 2025-08-21 PROCEDURE — 87040 BLOOD CULTURE FOR BACTERIA: CPT | Performed by: EMERGENCY MEDICINE

## 2025-08-21 PROCEDURE — 85007 BL SMEAR W/DIFF WBC COUNT: CPT | Performed by: EMERGENCY MEDICINE

## 2025-08-21 PROCEDURE — 2500000004 HC RX 250 GENERAL PHARMACY W/ HCPCS (ALT 636 FOR OP/ED): Performed by: FAMILY MEDICINE

## 2025-08-21 PROCEDURE — 83605 ASSAY OF LACTIC ACID: CPT | Performed by: EMERGENCY MEDICINE

## 2025-08-21 PROCEDURE — 96365 THER/PROPH/DIAG IV INF INIT: CPT

## 2025-08-21 PROCEDURE — 74177 CT ABD & PELVIS W/CONTRAST: CPT | Performed by: RADIOLOGY

## 2025-08-21 PROCEDURE — 1159F MED LIST DOCD IN RCRD: CPT | Performed by: FAMILY MEDICINE

## 2025-08-21 PROCEDURE — 1036F TOBACCO NON-USER: CPT | Performed by: FAMILY MEDICINE

## 2025-08-21 PROCEDURE — 85730 THROMBOPLASTIN TIME PARTIAL: CPT | Performed by: EMERGENCY MEDICINE

## 2025-08-21 PROCEDURE — 99223 1ST HOSP IP/OBS HIGH 75: CPT | Performed by: FAMILY MEDICINE

## 2025-08-21 PROCEDURE — 99215 OFFICE O/P EST HI 40 MIN: CPT | Performed by: FAMILY MEDICINE

## 2025-08-21 PROCEDURE — 84132 ASSAY OF SERUM POTASSIUM: CPT | Performed by: EMERGENCY MEDICINE

## 2025-08-21 RX ORDER — FAMOTIDINE 20 MG/1
20 TABLET, FILM COATED ORAL 2 TIMES DAILY PRN
Status: DISCONTINUED | OUTPATIENT
Start: 2025-08-21 | End: 2025-08-23 | Stop reason: HOSPADM

## 2025-08-21 RX ORDER — POLYETHYLENE GLYCOL 3350 17 G/17G
17 POWDER, FOR SOLUTION ORAL DAILY PRN
Status: DISCONTINUED | OUTPATIENT
Start: 2025-08-21 | End: 2025-08-23 | Stop reason: HOSPADM

## 2025-08-21 RX ORDER — ENOXAPARIN SODIUM 100 MG/ML
40 INJECTION SUBCUTANEOUS EVERY 24 HOURS
Status: DISCONTINUED | OUTPATIENT
Start: 2025-08-21 | End: 2025-08-23 | Stop reason: HOSPADM

## 2025-08-21 RX ORDER — VANCOMYCIN HYDROCHLORIDE 1 G/200ML
1000 INJECTION, SOLUTION INTRAVENOUS ONCE
Status: COMPLETED | OUTPATIENT
Start: 2025-08-21 | End: 2025-08-21

## 2025-08-21 RX ORDER — VALSARTAN 80 MG/1
80 TABLET ORAL DAILY
Status: DISCONTINUED | OUTPATIENT
Start: 2025-08-22 | End: 2025-08-23 | Stop reason: HOSPADM

## 2025-08-21 RX ORDER — ROSUVASTATIN CALCIUM 20 MG/1
20 TABLET, COATED ORAL NIGHTLY
Status: DISCONTINUED | OUTPATIENT
Start: 2025-08-21 | End: 2025-08-23 | Stop reason: HOSPADM

## 2025-08-21 RX ORDER — CEFTRIAXONE 1 G/50ML
1 INJECTION, SOLUTION INTRAVENOUS EVERY 24 HOURS
Status: DISCONTINUED | OUTPATIENT
Start: 2025-08-22 | End: 2025-08-23 | Stop reason: HOSPADM

## 2025-08-21 RX ADMIN — PIPERACILLIN SODIUM AND TAZOBACTAM SODIUM 3.38 G: 3; .375 INJECTION, SOLUTION INTRAVENOUS at 15:18

## 2025-08-21 RX ADMIN — ENOXAPARIN SODIUM 40 MG: 100 INJECTION SUBCUTANEOUS at 21:38

## 2025-08-21 RX ADMIN — VANCOMYCIN HYDROCHLORIDE 1000 MG: 1 INJECTION, SOLUTION INTRAVENOUS at 15:59

## 2025-08-21 RX ADMIN — IOHEXOL 75 ML: 350 INJECTION, SOLUTION INTRAVENOUS at 17:41

## 2025-08-21 SDOH — SOCIAL STABILITY: SOCIAL NETWORK: IN A TYPICAL WEEK, HOW MANY TIMES DO YOU TALK ON THE PHONE WITH FAMILY, FRIENDS, OR NEIGHBORS?: TWICE A WEEK

## 2025-08-21 SDOH — SOCIAL STABILITY: SOCIAL INSECURITY: DO YOU FEEL ANYONE HAS EXPLOITED OR TAKEN ADVANTAGE OF YOU FINANCIALLY OR OF YOUR PERSONAL PROPERTY?: NO

## 2025-08-21 SDOH — SOCIAL STABILITY: SOCIAL NETWORK: HOW OFTEN DO YOU ATTEND MEETINGS OF THE CLUBS OR ORGANIZATIONS YOU BELONG TO?: MORE THAN 4 TIMES PER YEAR

## 2025-08-21 SDOH — SOCIAL STABILITY: SOCIAL INSECURITY
WITHIN THE LAST YEAR, HAVE YOU BEEN RAPED OR FORCED TO HAVE ANY KIND OF SEXUAL ACTIVITY BY YOUR PARTNER OR EX-PARTNER?: NO

## 2025-08-21 SDOH — SOCIAL STABILITY: SOCIAL INSECURITY: WITHIN THE LAST YEAR, HAVE YOU BEEN AFRAID OF YOUR PARTNER OR EX-PARTNER?: NO

## 2025-08-21 SDOH — ECONOMIC STABILITY: FOOD INSECURITY: WITHIN THE PAST 12 MONTHS, THE FOOD YOU BOUGHT JUST DIDN'T LAST AND YOU DIDN'T HAVE MONEY TO GET MORE.: NEVER TRUE

## 2025-08-21 SDOH — ECONOMIC STABILITY: INCOME INSECURITY: IN THE PAST 12 MONTHS HAS THE ELECTRIC, GAS, OIL, OR WATER COMPANY THREATENED TO SHUT OFF SERVICES IN YOUR HOME?: NO

## 2025-08-21 SDOH — HEALTH STABILITY: PHYSICAL HEALTH: ON AVERAGE, HOW MANY MINUTES DO YOU ENGAGE IN EXERCISE AT THIS LEVEL?: 40 MIN

## 2025-08-21 SDOH — ECONOMIC STABILITY: FOOD INSECURITY: WITHIN THE PAST 12 MONTHS, YOU WORRIED THAT YOUR FOOD WOULD RUN OUT BEFORE YOU GOT THE MONEY TO BUY MORE.: NEVER TRUE

## 2025-08-21 SDOH — SOCIAL STABILITY: SOCIAL INSECURITY: WITHIN THE LAST YEAR, HAVE YOU BEEN HUMILIATED OR EMOTIONALLY ABUSED IN OTHER WAYS BY YOUR PARTNER OR EX-PARTNER?: NO

## 2025-08-21 SDOH — SOCIAL STABILITY: SOCIAL INSECURITY
WITHIN THE LAST YEAR, HAVE YOU BEEN KICKED, HIT, SLAPPED, OR OTHERWISE PHYSICALLY HURT BY YOUR PARTNER OR EX-PARTNER?: NO

## 2025-08-21 SDOH — SOCIAL STABILITY: SOCIAL INSECURITY: HAVE YOU HAD ANY THOUGHTS OF HARMING ANYONE ELSE?: NO

## 2025-08-21 SDOH — HEALTH STABILITY: PHYSICAL HEALTH: ON AVERAGE, HOW MANY DAYS PER WEEK DO YOU ENGAGE IN MODERATE TO STRENUOUS EXERCISE (LIKE A BRISK WALK)?: 2 DAYS

## 2025-08-21 SDOH — SOCIAL STABILITY: SOCIAL INSECURITY: ARE THERE ANY APPARENT SIGNS OF INJURIES/BEHAVIORS THAT COULD BE RELATED TO ABUSE/NEGLECT?: NO

## 2025-08-21 SDOH — SOCIAL STABILITY: SOCIAL NETWORK: HOW OFTEN DO YOU ATTEND CHURCH OR RELIGIOUS SERVICES?: MORE THAN 4 TIMES PER YEAR

## 2025-08-21 SDOH — SOCIAL STABILITY: SOCIAL INSECURITY: ARE YOU MARRIED, WIDOWED, DIVORCED, SEPARATED, NEVER MARRIED, OR LIVING WITH A PARTNER?: MARRIED

## 2025-08-21 SDOH — HEALTH STABILITY: MENTAL HEALTH
DO YOU FEEL STRESS - TENSE, RESTLESS, NERVOUS, OR ANXIOUS, OR UNABLE TO SLEEP AT NIGHT BECAUSE YOUR MIND IS TROUBLED ALL THE TIME - THESE DAYS?: NOT AT ALL

## 2025-08-21 SDOH — SOCIAL STABILITY: SOCIAL NETWORK: HOW OFTEN DO YOU GET TOGETHER WITH FRIENDS OR RELATIVES?: TWICE A WEEK

## 2025-08-21 SDOH — SOCIAL STABILITY: SOCIAL NETWORK
DO YOU BELONG TO ANY CLUBS OR ORGANIZATIONS SUCH AS CHURCH GROUPS, UNIONS, FRATERNAL OR ATHLETIC GROUPS, OR SCHOOL GROUPS?: YES

## 2025-08-21 SDOH — HEALTH STABILITY: PHYSICAL HEALTH
HOW OFTEN DO YOU NEED TO HAVE SOMEONE HELP YOU WHEN YOU READ INSTRUCTIONS, PAMPHLETS, OR OTHER WRITTEN MATERIAL FROM YOUR DOCTOR OR PHARMACY?: NEVER

## 2025-08-21 SDOH — SOCIAL STABILITY: SOCIAL INSECURITY: HAS ANYONE EVER THREATENED TO HURT YOUR FAMILY OR YOUR PETS?: NO

## 2025-08-21 SDOH — SOCIAL STABILITY: SOCIAL INSECURITY: DOES ANYONE TRY TO KEEP YOU FROM HAVING/CONTACTING OTHER FRIENDS OR DOING THINGS OUTSIDE YOUR HOME?: NO

## 2025-08-21 SDOH — SOCIAL STABILITY: SOCIAL INSECURITY: WERE YOU ABLE TO COMPLETE ALL THE BEHAVIORAL HEALTH SCREENINGS?: YES

## 2025-08-21 SDOH — SOCIAL STABILITY: SOCIAL INSECURITY: ARE YOU OR HAVE YOU BEEN THREATENED OR ABUSED PHYSICALLY, EMOTIONALLY, OR SEXUALLY BY ANYONE?: NO

## 2025-08-21 SDOH — SOCIAL STABILITY: SOCIAL INSECURITY: DO YOU FEEL UNSAFE GOING BACK TO THE PLACE WHERE YOU ARE LIVING?: NO

## 2025-08-21 SDOH — SOCIAL STABILITY: SOCIAL INSECURITY: ABUSE: ADULT

## 2025-08-21 SDOH — SOCIAL STABILITY: SOCIAL INSECURITY: HAVE YOU HAD THOUGHTS OF HARMING ANYONE ELSE?: NO

## 2025-08-21 ASSESSMENT — COGNITIVE AND FUNCTIONAL STATUS - GENERAL
MOBILITY SCORE: 24
DAILY ACTIVITIY SCORE: 24
PATIENT BASELINE BEDBOUND: NO

## 2025-08-21 ASSESSMENT — PATIENT HEALTH QUESTIONNAIRE - PHQ9
2. FEELING DOWN, DEPRESSED OR HOPELESS: NOT AT ALL
1. LITTLE INTEREST OR PLEASURE IN DOING THINGS: NOT AT ALL
1. LITTLE INTEREST OR PLEASURE IN DOING THINGS: NOT AT ALL
2. FEELING DOWN, DEPRESSED OR HOPELESS: NOT AT ALL
SUM OF ALL RESPONSES TO PHQ9 QUESTIONS 1 AND 2: 0
SUM OF ALL RESPONSES TO PHQ9 QUESTIONS 1 & 2: 0

## 2025-08-21 ASSESSMENT — ACTIVITIES OF DAILY LIVING (ADL)
ADEQUATE_TO_COMPLETE_ADL: YES
BATHING: INDEPENDENT
FEEDING YOURSELF: INDEPENDENT
GROOMING: INDEPENDENT
HEARING - LEFT EAR: FUNCTIONAL
WALKS IN HOME: INDEPENDENT
TOILETING: INDEPENDENT
PATIENT'S MEMORY ADEQUATE TO SAFELY COMPLETE DAILY ACTIVITIES?: YES
ASSISTIVE_DEVICE: EYEGLASSES
DRESSING YOURSELF: INDEPENDENT
LACK_OF_TRANSPORTATION: NO
JUDGMENT_ADEQUATE_SAFELY_COMPLETE_DAILY_ACTIVITIES: YES
HEARING - RIGHT EAR: FUNCTIONAL

## 2025-08-21 ASSESSMENT — LIFESTYLE VARIABLES
HOW OFTEN DO YOU HAVE 6 OR MORE DRINKS ON ONE OCCASION: NEVER
AUDIT-C TOTAL SCORE: 2
PRESCIPTION_ABUSE_PAST_12_MONTHS: NO
AUDIT-C TOTAL SCORE: 2
SKIP TO QUESTIONS 9-10: 1
SUBSTANCE_ABUSE_PAST_12_MONTHS: NO
HOW MANY STANDARD DRINKS CONTAINING ALCOHOL DO YOU HAVE ON A TYPICAL DAY: 1 OR 2
HOW OFTEN DO YOU HAVE A DRINK CONTAINING ALCOHOL: 2-4 TIMES A MONTH

## 2025-08-21 ASSESSMENT — PAIN - FUNCTIONAL ASSESSMENT
PAIN_FUNCTIONAL_ASSESSMENT: 0-10
PAIN_FUNCTIONAL_ASSESSMENT: 0-10

## 2025-08-21 ASSESSMENT — PAIN SCALES - GENERAL
PAINLEVEL_OUTOF10: 0 - NO PAIN
PAINLEVEL_OUTOF10: 0-NO PAIN
PAINLEVEL_OUTOF10: 0 - NO PAIN

## 2025-08-22 LAB
ALBUMIN SERPL BCP-MCNC: 3.2 G/DL (ref 3.4–5)
ANION GAP SERPL CALC-SCNC: 10 MMOL/L (ref 10–20)
BUN SERPL-MCNC: 18 MG/DL (ref 6–23)
CALCIUM SERPL-MCNC: 8.3 MG/DL (ref 8.6–10.3)
CHLORIDE SERPL-SCNC: 106 MMOL/L (ref 98–107)
CO2 SERPL-SCNC: 24 MMOL/L (ref 21–32)
CREAT SERPL-MCNC: 0.97 MG/DL (ref 0.5–1.3)
EGFRCR SERPLBLD CKD-EPI 2021: 77 ML/MIN/1.73M*2
ERYTHROCYTE [DISTWIDTH] IN BLOOD BY AUTOMATED COUNT: 13 % (ref 11.5–14.5)
FOLATE SERPL-MCNC: 18.6 NG/ML
GLUCOSE SERPL-MCNC: 107 MG/DL (ref 74–99)
HCT VFR BLD AUTO: 33.1 % (ref 41–52)
HGB BLD-MCNC: 11.4 G/DL (ref 13.5–17.5)
HOLD SPECIMEN: NORMAL
HOLD SPECIMEN: NORMAL
MAGNESIUM SERPL-MCNC: 2.08 MG/DL (ref 1.6–2.4)
MCH RBC QN AUTO: 32.5 PG (ref 26–34)
MCHC RBC AUTO-ENTMCNC: 34.4 G/DL (ref 32–36)
MCV RBC AUTO: 94 FL (ref 80–100)
NRBC BLD-RTO: 0 /100 WBCS (ref 0–0)
PHOSPHATE SERPL-MCNC: 3.8 MG/DL (ref 2.5–4.9)
PLATELET # BLD AUTO: 82 X10*3/UL (ref 150–450)
POTASSIUM SERPL-SCNC: 3.6 MMOL/L (ref 3.5–5.3)
RBC # BLD AUTO: 3.51 X10*6/UL (ref 4.5–5.9)
SODIUM SERPL-SCNC: 136 MMOL/L (ref 136–145)
VIT B12 SERPL-MCNC: 208 PG/ML (ref 211–911)
WBC # BLD AUTO: 12.3 X10*3/UL (ref 4.4–11.3)

## 2025-08-22 PROCEDURE — 36415 COLL VENOUS BLD VENIPUNCTURE: CPT | Performed by: FAMILY MEDICINE

## 2025-08-22 PROCEDURE — 83735 ASSAY OF MAGNESIUM: CPT | Performed by: FAMILY MEDICINE

## 2025-08-22 PROCEDURE — 97165 OT EVAL LOW COMPLEX 30 MIN: CPT | Mod: GO

## 2025-08-22 PROCEDURE — 80069 RENAL FUNCTION PANEL: CPT | Performed by: FAMILY MEDICINE

## 2025-08-22 PROCEDURE — 99222 1ST HOSP IP/OBS MODERATE 55: CPT | Performed by: INTERNAL MEDICINE

## 2025-08-22 PROCEDURE — 99232 SBSQ HOSP IP/OBS MODERATE 35: CPT | Performed by: STUDENT IN AN ORGANIZED HEALTH CARE EDUCATION/TRAINING PROGRAM

## 2025-08-22 PROCEDURE — 2500000004 HC RX 250 GENERAL PHARMACY W/ HCPCS (ALT 636 FOR OP/ED): Performed by: FAMILY MEDICINE

## 2025-08-22 PROCEDURE — 1210000001 HC SEMI-PRIVATE ROOM DAILY

## 2025-08-22 PROCEDURE — 82607 VITAMIN B-12: CPT | Mod: ELYLAB | Performed by: STUDENT IN AN ORGANIZED HEALTH CARE EDUCATION/TRAINING PROGRAM

## 2025-08-22 PROCEDURE — 85027 COMPLETE CBC AUTOMATED: CPT | Performed by: FAMILY MEDICINE

## 2025-08-22 PROCEDURE — 97161 PT EVAL LOW COMPLEX 20 MIN: CPT | Mod: GP | Performed by: PHYSICAL THERAPIST

## 2025-08-22 PROCEDURE — 2500000002 HC RX 250 W HCPCS SELF ADMINISTERED DRUGS (ALT 637 FOR MEDICARE OP, ALT 636 FOR OP/ED): Performed by: FAMILY MEDICINE

## 2025-08-22 PROCEDURE — 82746 ASSAY OF FOLIC ACID SERUM: CPT | Mod: ELYLAB | Performed by: STUDENT IN AN ORGANIZED HEALTH CARE EDUCATION/TRAINING PROGRAM

## 2025-08-22 RX ADMIN — ROSUVASTATIN CALCIUM 20 MG: 20 TABLET, FILM COATED ORAL at 09:24

## 2025-08-22 RX ADMIN — ENOXAPARIN SODIUM 40 MG: 100 INJECTION SUBCUTANEOUS at 21:42

## 2025-08-22 RX ADMIN — CEFTRIAXONE 1 G: 1 INJECTION, SOLUTION INTRAVENOUS at 09:24

## 2025-08-22 ASSESSMENT — COGNITIVE AND FUNCTIONAL STATUS - GENERAL
DAILY ACTIVITIY SCORE: 24
MOBILITY SCORE: 24

## 2025-08-22 ASSESSMENT — PAIN SCALES - GENERAL
PAINLEVEL_OUTOF10: 0 - NO PAIN

## 2025-08-22 ASSESSMENT — PAIN - FUNCTIONAL ASSESSMENT
PAIN_FUNCTIONAL_ASSESSMENT: 0-10
PAIN_FUNCTIONAL_ASSESSMENT: 0-10

## 2025-08-22 ASSESSMENT — ACTIVITIES OF DAILY LIVING (ADL): BATHING_ASSISTANCE: MODIFIED INDEPENDENT (DEVICE)

## 2025-08-23 VITALS
RESPIRATION RATE: 16 BRPM | HEIGHT: 67 IN | SYSTOLIC BLOOD PRESSURE: 119 MMHG | DIASTOLIC BLOOD PRESSURE: 58 MMHG | TEMPERATURE: 97.7 F | HEART RATE: 87 BPM | OXYGEN SATURATION: 95 % | BODY MASS INDEX: 27.3 KG/M2 | WEIGHT: 173.94 LBS

## 2025-08-23 LAB
AMMONIA PLAS-SCNC: 27 UMOL/L (ref 16–53)
ANION GAP SERPL CALC-SCNC: 12 MMOL/L (ref 10–20)
BASOPHILS # BLD AUTO: 0.07 X10*3/UL (ref 0–0.1)
BASOPHILS NFR BLD AUTO: 0.5 %
BUN SERPL-MCNC: 16 MG/DL (ref 6–23)
CALCIUM SERPL-MCNC: 8.5 MG/DL (ref 8.6–10.3)
CHLORIDE SERPL-SCNC: 104 MMOL/L (ref 98–107)
CO2 SERPL-SCNC: 26 MMOL/L (ref 21–32)
CREAT SERPL-MCNC: 0.96 MG/DL (ref 0.5–1.3)
CULTURE, BLOOD ID SENSITIVITY: ABNORMAL
CULTURE, BLOOD ID SENSITIVITY: ABNORMAL
EGFRCR SERPLBLD CKD-EPI 2021: 78 ML/MIN/1.73M*2
EOSINOPHIL # BLD AUTO: 0.43 X10*3/UL (ref 0–0.4)
EOSINOPHIL NFR BLD AUTO: 3.1 %
ERYTHROCYTE [DISTWIDTH] IN BLOOD BY AUTOMATED COUNT: 12.8 % (ref 11.5–14.5)
GLUCOSE SERPL-MCNC: 108 MG/DL (ref 74–99)
HCT VFR BLD AUTO: 37.9 % (ref 41–52)
HGB BLD-MCNC: 12.9 G/DL (ref 13.5–17.5)
IMM GRANULOCYTES # BLD AUTO: 0.1 X10*3/UL (ref 0–0.5)
IMM GRANULOCYTES NFR BLD AUTO: 0.7 % (ref 0–0.9)
LYMPHOCYTES # BLD AUTO: 2.19 X10*3/UL (ref 0.8–3)
LYMPHOCYTES NFR BLD AUTO: 15.8 %
MAGNESIUM SERPL-MCNC: 2.07 MG/DL (ref 1.6–2.4)
MCH RBC QN AUTO: 32.5 PG (ref 26–34)
MCHC RBC AUTO-ENTMCNC: 34 G/DL (ref 32–36)
MCV RBC AUTO: 96 FL (ref 80–100)
MONOCYTES # BLD AUTO: 1.45 X10*3/UL (ref 0.05–0.8)
MONOCYTES NFR BLD AUTO: 10.4 %
NEUTROPHILS # BLD AUTO: 9.65 X10*3/UL (ref 1.6–5.5)
NEUTROPHILS NFR BLD AUTO: 69.5 %
NRBC BLD-RTO: 0 /100 WBCS (ref 0–0)
ORGANISM: ABNORMAL
PLATELET # BLD AUTO: 114 X10*3/UL (ref 150–450)
POTASSIUM SERPL-SCNC: 3.9 MMOL/L (ref 3.5–5.3)
PROCALCITONIN SERPL-MCNC: 0.22 NG/ML
RBC # BLD AUTO: 3.97 X10*6/UL (ref 4.5–5.9)
SODIUM SERPL-SCNC: 138 MMOL/L (ref 136–145)
WBC # BLD AUTO: 13.9 X10*3/UL (ref 4.4–11.3)

## 2025-08-23 PROCEDURE — 2500000002 HC RX 250 W HCPCS SELF ADMINISTERED DRUGS (ALT 637 FOR MEDICARE OP, ALT 636 FOR OP/ED): Performed by: FAMILY MEDICINE

## 2025-08-23 PROCEDURE — 85025 COMPLETE CBC W/AUTO DIFF WBC: CPT | Performed by: STUDENT IN AN ORGANIZED HEALTH CARE EDUCATION/TRAINING PROGRAM

## 2025-08-23 PROCEDURE — 84145 PROCALCITONIN (PCT): CPT | Mod: ELYLAB | Performed by: STUDENT IN AN ORGANIZED HEALTH CARE EDUCATION/TRAINING PROGRAM

## 2025-08-23 PROCEDURE — 36415 COLL VENOUS BLD VENIPUNCTURE: CPT | Performed by: STUDENT IN AN ORGANIZED HEALTH CARE EDUCATION/TRAINING PROGRAM

## 2025-08-23 PROCEDURE — 82140 ASSAY OF AMMONIA: CPT | Performed by: STUDENT IN AN ORGANIZED HEALTH CARE EDUCATION/TRAINING PROGRAM

## 2025-08-23 PROCEDURE — 80048 BASIC METABOLIC PNL TOTAL CA: CPT | Performed by: STUDENT IN AN ORGANIZED HEALTH CARE EDUCATION/TRAINING PROGRAM

## 2025-08-23 PROCEDURE — 99239 HOSP IP/OBS DSCHRG MGMT >30: CPT | Performed by: STUDENT IN AN ORGANIZED HEALTH CARE EDUCATION/TRAINING PROGRAM

## 2025-08-23 PROCEDURE — 83735 ASSAY OF MAGNESIUM: CPT | Performed by: STUDENT IN AN ORGANIZED HEALTH CARE EDUCATION/TRAINING PROGRAM

## 2025-08-23 PROCEDURE — 86788 WEST NILE VIRUS AB IGM: CPT | Performed by: INTERNAL MEDICINE

## 2025-08-23 PROCEDURE — 2500000004 HC RX 250 GENERAL PHARMACY W/ HCPCS (ALT 636 FOR OP/ED): Performed by: FAMILY MEDICINE

## 2025-08-23 RX ORDER — AMOXICILLIN 500 MG/1
500 CAPSULE ORAL EVERY 8 HOURS SCHEDULED
Qty: 21 CAPSULE | Refills: 0 | Status: SHIPPED | OUTPATIENT
Start: 2025-08-23 | End: 2025-08-31

## 2025-08-23 RX ADMIN — VALSARTAN 80 MG: 80 TABLET, FILM COATED ORAL at 08:00

## 2025-08-23 RX ADMIN — ROSUVASTATIN CALCIUM 20 MG: 20 TABLET, FILM COATED ORAL at 08:00

## 2025-08-23 RX ADMIN — CEFTRIAXONE 1 G: 1 INJECTION, SOLUTION INTRAVENOUS at 08:01

## 2025-08-23 ASSESSMENT — COGNITIVE AND FUNCTIONAL STATUS - GENERAL
MOBILITY SCORE: 24
DAILY ACTIVITIY SCORE: 24

## 2025-08-23 ASSESSMENT — PAIN SCALES - GENERAL: PAINLEVEL_OUTOF10: 0 - NO PAIN

## 2025-08-24 LAB
BACTERIA BLD CULT: NORMAL

## 2025-08-25 ENCOUNTER — PATIENT OUTREACH (OUTPATIENT)
Dept: PRIMARY CARE | Facility: CLINIC | Age: 85
End: 2025-08-25
Payer: MEDICARE

## 2025-08-25 LAB
BACTERIA BLD CULT: NORMAL
HOLD SPECIMEN: NORMAL

## 2025-08-26 ENCOUNTER — HOSPITAL ENCOUNTER (INPATIENT)
Age: 85
LOS: 1 days | Discharge: HOME | End: 2025-08-28
Attending: STUDENT IN AN ORGANIZED HEALTH CARE EDUCATION/TRAINING PROGRAM | Admitting: INTERNAL MEDICINE
Payer: MEDICARE

## 2025-08-26 ENCOUNTER — APPOINTMENT (OUTPATIENT)
Dept: GENERAL RADIOLOGY | Age: 85
End: 2025-08-26
Payer: MEDICARE

## 2025-08-26 DIAGNOSIS — R79.89 ELEVATED TROPONIN: ICD-10-CM

## 2025-08-26 DIAGNOSIS — G45.9 TIA (TRANSIENT ISCHEMIC ATTACK): ICD-10-CM

## 2025-08-26 DIAGNOSIS — R41.82 ALTERED MENTAL STATUS, UNSPECIFIED ALTERED MENTAL STATUS TYPE: Primary | ICD-10-CM

## 2025-08-26 LAB
CHP ED QC CHECK: YES
GLUCOSE BLD-MCNC: 97 MG/DL
GLUCOSE BLD-MCNC: 97 MG/DL (ref 70–99)
PERFORMED ON: NORMAL
WNV IGG SER IA-ACNC: 0.44 IV
WNV IGM SER IA-ACNC: 0 IV

## 2025-08-26 PROCEDURE — 99285 EMERGENCY DEPT VISIT HI MDM: CPT

## 2025-08-26 PROCEDURE — 71045 X-RAY EXAM CHEST 1 VIEW: CPT

## 2025-08-26 PROCEDURE — 84145 PROCALCITONIN (PCT): CPT

## 2025-08-26 PROCEDURE — 93005 ELECTROCARDIOGRAM TRACING: CPT | Performed by: STUDENT IN AN ORGANIZED HEALTH CARE EDUCATION/TRAINING PROGRAM

## 2025-08-26 PROCEDURE — 2580000003 HC RX 258: Performed by: STUDENT IN AN ORGANIZED HEALTH CARE EDUCATION/TRAINING PROGRAM

## 2025-08-26 PROCEDURE — 36415 COLL VENOUS BLD VENIPUNCTURE: CPT

## 2025-08-26 PROCEDURE — 83605 ASSAY OF LACTIC ACID: CPT

## 2025-08-26 PROCEDURE — 85025 COMPLETE CBC W/AUTO DIFF WBC: CPT

## 2025-08-26 PROCEDURE — 84443 ASSAY THYROID STIM HORMONE: CPT

## 2025-08-26 PROCEDURE — 85610 PROTHROMBIN TIME: CPT

## 2025-08-26 PROCEDURE — 84484 ASSAY OF TROPONIN QUANT: CPT

## 2025-08-26 PROCEDURE — 87040 BLOOD CULTURE FOR BACTERIA: CPT

## 2025-08-26 PROCEDURE — 80053 COMPREHEN METABOLIC PANEL: CPT

## 2025-08-26 PROCEDURE — 82077 ASSAY SPEC XCP UR&BREATH IA: CPT

## 2025-08-26 PROCEDURE — 83735 ASSAY OF MAGNESIUM: CPT

## 2025-08-26 RX ORDER — 0.9 % SODIUM CHLORIDE 0.9 %
500 INTRAVENOUS SOLUTION INTRAVENOUS ONCE
Status: COMPLETED | OUTPATIENT
Start: 2025-08-26 | End: 2025-08-27

## 2025-08-26 RX ADMIN — SODIUM CHLORIDE 500 ML: 0.9 INJECTION, SOLUTION INTRAVENOUS at 23:53

## 2025-08-26 ASSESSMENT — PAIN SCALES - GENERAL: PAINLEVEL_OUTOF10: 0

## 2025-08-26 ASSESSMENT — PAIN - FUNCTIONAL ASSESSMENT: PAIN_FUNCTIONAL_ASSESSMENT: 0-10

## 2025-08-26 ASSESSMENT — LIFESTYLE VARIABLES
HOW OFTEN DO YOU HAVE A DRINK CONTAINING ALCOHOL: NEVER
HOW MANY STANDARD DRINKS CONTAINING ALCOHOL DO YOU HAVE ON A TYPICAL DAY: PATIENT DOES NOT DRINK

## 2025-08-27 ENCOUNTER — APPOINTMENT (OUTPATIENT)
Dept: ULTRASOUND IMAGING | Age: 85
End: 2025-08-27
Payer: MEDICARE

## 2025-08-27 ENCOUNTER — APPOINTMENT (OUTPATIENT)
Age: 85
End: 2025-08-27
Payer: MEDICARE

## 2025-08-27 ENCOUNTER — APPOINTMENT (OUTPATIENT)
Dept: CT IMAGING | Age: 85
End: 2025-08-27
Payer: MEDICARE

## 2025-08-27 PROBLEM — R41.82 AMS (ALTERED MENTAL STATUS): Status: ACTIVE | Noted: 2025-08-27

## 2025-08-27 PROBLEM — R79.89 ELEVATED TROPONIN: Status: ACTIVE | Noted: 2025-08-27

## 2025-08-27 LAB
ALBUMIN SERPL-MCNC: 3.2 G/DL (ref 3.5–4.6)
ALBUMIN SERPL-MCNC: 3.6 G/DL (ref 3.5–4.6)
ALP SERPL-CCNC: 76 U/L (ref 35–104)
ALP SERPL-CCNC: 93 U/L (ref 35–104)
ALT SERPL-CCNC: 36 U/L (ref 0–41)
ALT SERPL-CCNC: 39 U/L (ref 0–41)
AMPHET UR QL SCN: NORMAL
ANION GAP SERPL CALCULATED.3IONS-SCNC: 8 MEQ/L (ref 9–15)
ANION GAP SERPL CALCULATED.3IONS-SCNC: 8 MEQ/L (ref 9–15)
AST SERPL-CCNC: 39 U/L (ref 0–40)
AST SERPL-CCNC: 47 U/L (ref 0–40)
BARBITURATES UR QL SCN: NORMAL
BASOPHILS # BLD: 0.1 K/UL (ref 0–0.2)
BASOPHILS # BLD: 0.1 K/UL (ref 0–0.2)
BASOPHILS NFR BLD: 0.5 %
BASOPHILS NFR BLD: 0.5 %
BENZODIAZ UR QL SCN: NORMAL
BILIRUB SERPL-MCNC: 0.3 MG/DL (ref 0.2–0.7)
BILIRUB SERPL-MCNC: 0.4 MG/DL (ref 0.2–0.7)
BUN SERPL-MCNC: 13 MG/DL (ref 8–23)
BUN SERPL-MCNC: 14 MG/DL (ref 8–23)
CALCIUM SERPL-MCNC: 7.9 MG/DL (ref 8.5–9.9)
CALCIUM SERPL-MCNC: 8.7 MG/DL (ref 8.5–9.9)
CANNABINOIDS UR QL SCN: NORMAL
CHLORIDE SERPL-SCNC: 101 MEQ/L (ref 95–107)
CHLORIDE SERPL-SCNC: 104 MEQ/L (ref 95–107)
CO2 SERPL-SCNC: 24 MEQ/L (ref 20–31)
CO2 SERPL-SCNC: 25 MEQ/L (ref 20–31)
COCAINE UR QL SCN: NORMAL
CREAT SERPL-MCNC: 0.87 MG/DL (ref 0.7–1.2)
CREAT SERPL-MCNC: 0.92 MG/DL (ref 0.7–1.2)
DRUG SCREEN COMMENT UR-IMP: NORMAL
ECHO AO ROOT DIAM: 3.1 CM
ECHO AO ROOT INDEX: 1.62 CM/M2
ECHO AV AREA PEAK VELOCITY: 2.2 CM2
ECHO AV AREA VTI: 2.1 CM2
ECHO AV AREA/BSA PEAK VELOCITY: 1.2 CM2/M2
ECHO AV AREA/BSA VTI: 1.1 CM2/M2
ECHO AV MEAN GRADIENT: 3 MMHG
ECHO AV MEAN VELOCITY: 0.9 M/S
ECHO AV PEAK GRADIENT: 6 MMHG
ECHO AV PEAK VELOCITY: 1.2 M/S
ECHO AV VELOCITY RATIO: 0.75
ECHO AV VTI: 25 CM
ECHO BSA: 1.93 M2
ECHO EST RA PRESSURE: 3 MMHG
ECHO LA DIAMETER INDEX: 2.25 CM/M2
ECHO LA DIAMETER: 4.3 CM
ECHO LA TO AORTIC ROOT RATIO: 1.39
ECHO LA VOL A-L A2C: 54 ML (ref 18–58)
ECHO LA VOL A-L A4C: 55 ML (ref 18–58)
ECHO LA VOL MOD A2C: 50 ML (ref 18–58)
ECHO LA VOL MOD A4C: 51 ML (ref 18–58)
ECHO LA VOLUME AREA LENGTH: 56 ML
ECHO LA VOLUME INDEX A-L A2C: 28 ML/M2 (ref 16–34)
ECHO LA VOLUME INDEX A-L A4C: 29 ML/M2 (ref 16–34)
ECHO LA VOLUME INDEX AREA LENGTH: 29 ML/M2 (ref 16–34)
ECHO LA VOLUME INDEX MOD A2C: 26 ML/M2 (ref 16–34)
ECHO LA VOLUME INDEX MOD A4C: 27 ML/M2 (ref 16–34)
ECHO LV E' LATERAL VELOCITY: 7 CM/S
ECHO LV E' SEPTAL VELOCITY: 6.69 CM/S
ECHO LV EDV A2C: 36 ML
ECHO LV EDV A4C: 40 ML
ECHO LV EDV BP: 37 ML (ref 67–155)
ECHO LV EDV INDEX A4C: 21 ML/M2
ECHO LV EDV INDEX BP: 19 ML/M2
ECHO LV EDV NDEX A2C: 19 ML/M2
ECHO LV EF PHYSICIAN: 55 %
ECHO LV EJECTION FRACTION A2C: 58 %
ECHO LV EJECTION FRACTION A4C: 66 %
ECHO LV EJECTION FRACTION BIPLANE: 61 % (ref 55–100)
ECHO LV ESV A2C: 15 ML
ECHO LV ESV A4C: 14 ML
ECHO LV ESV BP: 15 ML (ref 22–58)
ECHO LV ESV INDEX A2C: 8 ML/M2
ECHO LV ESV INDEX A4C: 7 ML/M2
ECHO LV ESV INDEX BP: 8 ML/M2
ECHO LV FRACTIONAL SHORTENING: 42 % (ref 28–44)
ECHO LV INTERNAL DIMENSION DIASTOLE INDEX: 2.25 CM/M2
ECHO LV INTERNAL DIMENSION DIASTOLIC: 4.3 CM (ref 4.2–5.9)
ECHO LV INTERNAL DIMENSION SYSTOLIC INDEX: 1.31 CM/M2
ECHO LV INTERNAL DIMENSION SYSTOLIC: 2.5 CM
ECHO LV IVSD: 0.8 CM (ref 0.6–1)
ECHO LV IVSS: 1.1 CM
ECHO LV MASS 2D: 142.5 G (ref 88–224)
ECHO LV MASS INDEX 2D: 74.6 G/M2 (ref 49–115)
ECHO LV POSTERIOR WALL DIASTOLIC: 1.2 CM (ref 0.6–1)
ECHO LV POSTERIOR WALL SYSTOLIC: 1.6 CM
ECHO LV RELATIVE WALL THICKNESS RATIO: 0.56
ECHO LVOT AREA: 2.8 CM2
ECHO LVOT AV VTI INDEX: 0.72
ECHO LVOT DIAM: 1.9 CM
ECHO LVOT MEAN GRADIENT: 2 MMHG
ECHO LVOT PEAK GRADIENT: 4 MMHG
ECHO LVOT PEAK VELOCITY: 0.9 M/S
ECHO LVOT STROKE VOLUME INDEX: 26.6 ML/M2
ECHO LVOT SV: 50.7 ML
ECHO LVOT VTI: 17.9 CM
ECHO MV A VELOCITY: 1.8 M/S
ECHO MV AREA PHT: 4.2 CM2
ECHO MV AREA VTI: 1.5 CM2
ECHO MV E DECELERATION TIME (DT): 230.3 MS
ECHO MV E VELOCITY: 1.33 M/S
ECHO MV E/A RATIO: 0.74
ECHO MV E/E' LATERAL: 19
ECHO MV E/E' RATIO (AVERAGED): 19.44
ECHO MV E/E' SEPTAL: 19.88
ECHO MV LVOT VTI INDEX: 1.87
ECHO MV MAX VELOCITY: 1.7 M/S
ECHO MV MEAN GRADIENT: 5 MMHG
ECHO MV MEAN VELOCITY: 1.1 M/S
ECHO MV PEAK GRADIENT: 11 MMHG
ECHO MV PRESSURE HALF TIME (PHT): 51.8 MS
ECHO MV VTI: 33.4 CM
ECHO RIGHT VENTRICULAR SYSTOLIC PRESSURE (RVSP): 24 MMHG
ECHO RV INTERNAL DIMENSION: 2.7 CM
ECHO RV TAPSE: 1.5 CM (ref 1.7–?)
ECHO TV REGURGITANT MAX VELOCITY: 2.31 M/S
ECHO TV REGURGITANT PEAK GRADIENT: 21 MMHG
EKG ATRIAL RATE: 74 BPM
EKG ATRIAL RATE: 74 BPM
EKG DIAGNOSIS: NORMAL
EKG DIAGNOSIS: NORMAL
EKG P AXIS: 47 DEGREES
EKG P AXIS: 56 DEGREES
EKG P-R INTERVAL: 280 MS
EKG P-R INTERVAL: 284 MS
EKG Q-T INTERVAL: 440 MS
EKG Q-T INTERVAL: 446 MS
EKG QRS DURATION: 136 MS
EKG QRS DURATION: 150 MS
EKG QTC CALCULATION (BAZETT): 488 MS
EKG QTC CALCULATION (BAZETT): 495 MS
EKG R AXIS: 136 DEGREES
EKG R AXIS: 140 DEGREES
EKG T AXIS: -1 DEGREES
EKG T AXIS: -20 DEGREES
EKG VENTRICULAR RATE: 74 BPM
EKG VENTRICULAR RATE: 74 BPM
EOSINOPHIL # BLD: 0.4 K/UL (ref 0–0.7)
EOSINOPHIL # BLD: 0.5 K/UL (ref 0–0.7)
EOSINOPHIL NFR BLD: 3.7 %
EOSINOPHIL NFR BLD: 3.8 %
ERYTHROCYTE [DISTWIDTH] IN BLOOD BY AUTOMATED COUNT: 12.6 % (ref 11.5–14.5)
ERYTHROCYTE [DISTWIDTH] IN BLOOD BY AUTOMATED COUNT: 12.7 % (ref 11.5–14.5)
ETHANOL PERCENT: NORMAL G/DL
ETHANOLAMINE SERPL-MCNC: <10 MG/DL (ref 0–0.08)
FENTANYL SCREEN, URINE: NORMAL
FOLATE: 11.5 NG/ML (ref 4.8–24.2)
GLOBULIN SER CALC-MCNC: 2.5 G/DL (ref 2.3–3.5)
GLOBULIN SER CALC-MCNC: 2.8 G/DL (ref 2.3–3.5)
GLUCOSE SERPL-MCNC: 104 MG/DL (ref 70–99)
GLUCOSE SERPL-MCNC: 98 MG/DL (ref 70–99)
HCT VFR BLD AUTO: 35.5 % (ref 42–52)
HCT VFR BLD AUTO: 37.7 % (ref 42–52)
HGB BLD-MCNC: 11.9 G/DL (ref 14–18)
HGB BLD-MCNC: 12.7 G/DL (ref 14–18)
INR PPP: 1.1
LACTIC ACID, SEPSIS: 1.3 MMOL/L (ref 0.5–1.9)
LYMPHOCYTES # BLD: 2.1 K/UL (ref 1–4.8)
LYMPHOCYTES # BLD: 2.5 K/UL (ref 1–4.8)
LYMPHOCYTES NFR BLD: 17.7 %
LYMPHOCYTES NFR BLD: 22.4 %
MAGNESIUM SERPL-MCNC: 2.3 MG/DL (ref 1.7–2.4)
MAGNESIUM SERPL-MCNC: 2.4 MG/DL (ref 1.7–2.4)
MCH RBC QN AUTO: 32.5 PG (ref 27–31.3)
MCH RBC QN AUTO: 33 PG (ref 27–31.3)
MCHC RBC AUTO-ENTMCNC: 33.5 % (ref 33–37)
MCHC RBC AUTO-ENTMCNC: 33.7 % (ref 33–37)
MCV RBC AUTO: 97 FL (ref 79–92.2)
MCV RBC AUTO: 97.9 FL (ref 79–92.2)
METHADONE UR QL SCN: NORMAL
MONOCYTES # BLD: 1.1 K/UL (ref 0.2–0.8)
MONOCYTES # BLD: 1.4 K/UL (ref 0.2–0.8)
MONOCYTES NFR BLD: 11.5 %
MONOCYTES NFR BLD: 9.7 %
NEUTROPHILS # BLD: 6.9 K/UL (ref 1.4–6.5)
NEUTROPHILS # BLD: 7.9 K/UL (ref 1.4–6.5)
NEUTS SEG NFR BLD: 62.6 %
NEUTS SEG NFR BLD: 65.5 %
OPIATES UR QL SCN: NORMAL
OXYCODONE UR QL SCN: NORMAL
PCP UR QL SCN: NORMAL
PLATELET # BLD AUTO: 176 K/UL (ref 130–400)
PLATELET # BLD AUTO: 189 K/UL (ref 130–400)
POTASSIUM SERPL-SCNC: 4.1 MEQ/L (ref 3.4–4.9)
POTASSIUM SERPL-SCNC: 4.1 MEQ/L (ref 3.4–4.9)
PROCALCITONIN SERPL IA-MCNC: 0.09 NG/ML (ref 0–0.15)
PROPOXYPH UR QL SCN: NORMAL
PROT SERPL-MCNC: 5.7 G/DL (ref 6.3–8)
PROT SERPL-MCNC: 6.4 G/DL (ref 6.3–8)
PROTHROMBIN TIME: 14.8 SEC (ref 12.3–14.9)
RBC # BLD AUTO: 3.66 M/UL (ref 4.7–6.1)
RBC # BLD AUTO: 3.85 M/UL (ref 4.7–6.1)
SODIUM SERPL-SCNC: 134 MEQ/L (ref 135–144)
SODIUM SERPL-SCNC: 136 MEQ/L (ref 135–144)
TROPONIN, HIGH SENSITIVITY: 119 NG/L (ref 0–19)
TROPONIN, HIGH SENSITIVITY: 124 NG/L (ref 0–19)
TSH REFLEX: 2.54 UIU/ML (ref 0.44–3.86)
VITAMIN B-12: 389 PG/ML (ref 232–1245)
WBC # BLD AUTO: 11 K/UL (ref 4.8–10.8)
WBC # BLD AUTO: 12 K/UL (ref 4.8–10.8)

## 2025-08-27 PROCEDURE — 6370000000 HC RX 637 (ALT 250 FOR IP): Performed by: STUDENT IN AN ORGANIZED HEALTH CARE EDUCATION/TRAINING PROGRAM

## 2025-08-27 PROCEDURE — 93880 EXTRACRANIAL BILAT STUDY: CPT

## 2025-08-27 PROCEDURE — 82746 ASSAY OF FOLIC ACID SERUM: CPT

## 2025-08-27 PROCEDURE — 6360000004 HC RX CONTRAST MEDICATION: Performed by: NURSE PRACTITIONER

## 2025-08-27 PROCEDURE — 82607 VITAMIN B-12: CPT

## 2025-08-27 PROCEDURE — 80307 DRUG TEST PRSMV CHEM ANLYZR: CPT

## 2025-08-27 PROCEDURE — 70450 CT HEAD/BRAIN W/O DYE: CPT

## 2025-08-27 PROCEDURE — APPSS45 APP SPLIT SHARED TIME 31-45 MINUTES: Performed by: NURSE PRACTITIONER

## 2025-08-27 PROCEDURE — 83735 ASSAY OF MAGNESIUM: CPT

## 2025-08-27 PROCEDURE — 2500000003 HC RX 250 WO HCPCS

## 2025-08-27 PROCEDURE — 1210000000 HC MED SURG R&B

## 2025-08-27 PROCEDURE — 97166 OT EVAL MOD COMPLEX 45 MIN: CPT

## 2025-08-27 PROCEDURE — 87040 BLOOD CULTURE FOR BACTERIA: CPT

## 2025-08-27 PROCEDURE — 36415 COLL VENOUS BLD VENIPUNCTURE: CPT

## 2025-08-27 PROCEDURE — 2500000003 HC RX 250 WO HCPCS: Performed by: STUDENT IN AN ORGANIZED HEALTH CARE EDUCATION/TRAINING PROGRAM

## 2025-08-27 PROCEDURE — 93306 TTE W/DOPPLER COMPLETE: CPT | Performed by: INTERNAL MEDICINE

## 2025-08-27 PROCEDURE — 6360000002 HC RX W HCPCS: Performed by: STUDENT IN AN ORGANIZED HEALTH CARE EDUCATION/TRAINING PROGRAM

## 2025-08-27 PROCEDURE — 97162 PT EVAL MOD COMPLEX 30 MIN: CPT

## 2025-08-27 PROCEDURE — 93005 ELECTROCARDIOGRAM TRACING: CPT

## 2025-08-27 PROCEDURE — 70496 CT ANGIOGRAPHY HEAD: CPT

## 2025-08-27 PROCEDURE — 84484 ASSAY OF TROPONIN QUANT: CPT

## 2025-08-27 PROCEDURE — 96374 THER/PROPH/DIAG INJ IV PUSH: CPT

## 2025-08-27 PROCEDURE — 6360000002 HC RX W HCPCS

## 2025-08-27 PROCEDURE — 80053 COMPREHEN METABOLIC PANEL: CPT

## 2025-08-27 PROCEDURE — 99232 SBSQ HOSP IP/OBS MODERATE 35: CPT | Performed by: INTERNAL MEDICINE

## 2025-08-27 PROCEDURE — 85025 COMPLETE CBC W/AUTO DIFF WBC: CPT

## 2025-08-27 PROCEDURE — 94762 N-INVAS EAR/PLS OXIMTRY CONT: CPT

## 2025-08-27 PROCEDURE — 99223 1ST HOSP IP/OBS HIGH 75: CPT | Performed by: PSYCHIATRY & NEUROLOGY

## 2025-08-27 PROCEDURE — 6370000000 HC RX 637 (ALT 250 FOR IP)

## 2025-08-27 PROCEDURE — 70498 CT ANGIOGRAPHY NECK: CPT

## 2025-08-27 PROCEDURE — 93306 TTE W/DOPPLER COMPLETE: CPT

## 2025-08-27 PROCEDURE — 6370000000 HC RX 637 (ALT 250 FOR IP): Performed by: NURSE PRACTITIONER

## 2025-08-27 RX ORDER — METOPROLOL TARTRATE 50 MG
50 TABLET ORAL DAILY
COMMUNITY
Start: 2025-07-23

## 2025-08-27 RX ORDER — LOPERAMIDE HYDROCHLORIDE 2 MG/1
2 CAPSULE ORAL 4 TIMES DAILY PRN
Status: DISCONTINUED | OUTPATIENT
Start: 2025-08-27 | End: 2025-08-28 | Stop reason: HOSPADM

## 2025-08-27 RX ORDER — MAGNESIUM SULFATE IN WATER 40 MG/ML
2000 INJECTION, SOLUTION INTRAVENOUS PRN
Status: DISCONTINUED | OUTPATIENT
Start: 2025-08-27 | End: 2025-08-28 | Stop reason: HOSPADM

## 2025-08-27 RX ORDER — AMOXICILLIN 500 MG/1
500 CAPSULE ORAL 3 TIMES DAILY
Status: ON HOLD | COMMUNITY
Start: 2025-08-23 | End: 2025-08-28 | Stop reason: HOSPADM

## 2025-08-27 RX ORDER — SODIUM CHLORIDE 0.9 % (FLUSH) 0.9 %
5-40 SYRINGE (ML) INJECTION EVERY 12 HOURS SCHEDULED
Status: DISCONTINUED | OUTPATIENT
Start: 2025-08-27 | End: 2025-08-28 | Stop reason: HOSPADM

## 2025-08-27 RX ORDER — POTASSIUM CHLORIDE 1500 MG/1
40 TABLET, EXTENDED RELEASE ORAL PRN
Status: DISCONTINUED | OUTPATIENT
Start: 2025-08-27 | End: 2025-08-28 | Stop reason: HOSPADM

## 2025-08-27 RX ORDER — OLMESARTAN MEDOXOMIL 20 MG/1
20 TABLET ORAL DAILY
COMMUNITY
Start: 2024-09-17

## 2025-08-27 RX ORDER — ONDANSETRON 4 MG/1
4 TABLET, ORALLY DISINTEGRATING ORAL EVERY 8 HOURS PRN
Status: DISCONTINUED | OUTPATIENT
Start: 2025-08-27 | End: 2025-08-28 | Stop reason: HOSPADM

## 2025-08-27 RX ORDER — SODIUM CHLORIDE 9 MG/ML
INJECTION, SOLUTION INTRAVENOUS PRN
Status: DISCONTINUED | OUTPATIENT
Start: 2025-08-27 | End: 2025-08-28 | Stop reason: HOSPADM

## 2025-08-27 RX ORDER — IOPAMIDOL 755 MG/ML
75 INJECTION, SOLUTION INTRAVASCULAR
Status: COMPLETED | OUTPATIENT
Start: 2025-08-27 | End: 2025-08-27

## 2025-08-27 RX ORDER — ASPIRIN 325 MG
325 TABLET ORAL ONCE
Status: COMPLETED | OUTPATIENT
Start: 2025-08-27 | End: 2025-08-27

## 2025-08-27 RX ORDER — POTASSIUM CHLORIDE 7.45 MG/ML
10 INJECTION INTRAVENOUS PRN
Status: DISCONTINUED | OUTPATIENT
Start: 2025-08-27 | End: 2025-08-28 | Stop reason: HOSPADM

## 2025-08-27 RX ORDER — POLYETHYLENE GLYCOL 3350 17 G/17G
17 POWDER, FOR SOLUTION ORAL DAILY PRN
Status: DISCONTINUED | OUTPATIENT
Start: 2025-08-27 | End: 2025-08-28 | Stop reason: HOSPADM

## 2025-08-27 RX ORDER — ROSUVASTATIN CALCIUM 20 MG/1
20 TABLET, COATED ORAL DAILY
COMMUNITY
Start: 2025-06-08

## 2025-08-27 RX ORDER — ACETAMINOPHEN 650 MG/1
650 SUPPOSITORY RECTAL EVERY 6 HOURS PRN
Status: DISCONTINUED | OUTPATIENT
Start: 2025-08-27 | End: 2025-08-28 | Stop reason: HOSPADM

## 2025-08-27 RX ORDER — ONDANSETRON 2 MG/ML
4 INJECTION INTRAMUSCULAR; INTRAVENOUS EVERY 6 HOURS PRN
Status: DISCONTINUED | OUTPATIENT
Start: 2025-08-27 | End: 2025-08-28 | Stop reason: HOSPADM

## 2025-08-27 RX ORDER — LEVETIRACETAM 500 MG/1
500 TABLET ORAL 2 TIMES DAILY
Status: DISCONTINUED | OUTPATIENT
Start: 2025-08-27 | End: 2025-08-28 | Stop reason: HOSPADM

## 2025-08-27 RX ORDER — ROSUVASTATIN CALCIUM 20 MG/1
20 TABLET, COATED ORAL DAILY
Status: DISCONTINUED | OUTPATIENT
Start: 2025-08-27 | End: 2025-08-28 | Stop reason: HOSPADM

## 2025-08-27 RX ORDER — ENOXAPARIN SODIUM 100 MG/ML
40 INJECTION SUBCUTANEOUS DAILY
Status: DISCONTINUED | OUTPATIENT
Start: 2025-08-27 | End: 2025-08-28 | Stop reason: HOSPADM

## 2025-08-27 RX ORDER — ACETAMINOPHEN 325 MG/1
650 TABLET ORAL EVERY 6 HOURS PRN
Status: DISCONTINUED | OUTPATIENT
Start: 2025-08-27 | End: 2025-08-28 | Stop reason: HOSPADM

## 2025-08-27 RX ORDER — SODIUM CHLORIDE 0.9 % (FLUSH) 0.9 %
5-40 SYRINGE (ML) INJECTION PRN
Status: DISCONTINUED | OUTPATIENT
Start: 2025-08-27 | End: 2025-08-28 | Stop reason: HOSPADM

## 2025-08-27 RX ORDER — LOSARTAN POTASSIUM 50 MG/1
50 TABLET ORAL DAILY
Status: DISCONTINUED | OUTPATIENT
Start: 2025-08-27 | End: 2025-08-28 | Stop reason: HOSPADM

## 2025-08-27 RX ORDER — METOPROLOL TARTRATE 50 MG
50 TABLET ORAL DAILY
Status: DISCONTINUED | OUTPATIENT
Start: 2025-08-27 | End: 2025-08-28 | Stop reason: HOSPADM

## 2025-08-27 RX ADMIN — WATER 1000 MG: 1 INJECTION INTRAMUSCULAR; INTRAVENOUS; SUBCUTANEOUS at 02:07

## 2025-08-27 RX ADMIN — LEVETIRACETAM 500 MG: 500 TABLET, FILM COATED ORAL at 21:11

## 2025-08-27 RX ADMIN — LEVETIRACETAM 500 MG: 500 TABLET, FILM COATED ORAL at 14:49

## 2025-08-27 RX ADMIN — LOSARTAN POTASSIUM 50 MG: 50 TABLET, FILM COATED ORAL at 08:59

## 2025-08-27 RX ADMIN — Medication 3 MG: at 21:11

## 2025-08-27 RX ADMIN — IOPAMIDOL 75 ML: 755 INJECTION, SOLUTION INTRAVENOUS at 15:54

## 2025-08-27 RX ADMIN — ENOXAPARIN SODIUM 40 MG: 100 INJECTION SUBCUTANEOUS at 08:59

## 2025-08-27 RX ADMIN — ROSUVASTATIN CALCIUM 20 MG: 20 TABLET, FILM COATED ORAL at 08:59

## 2025-08-27 RX ADMIN — ASPIRIN 325 MG: 325 TABLET ORAL at 02:06

## 2025-08-27 RX ADMIN — Medication 10 ML: at 21:11

## 2025-08-27 RX ADMIN — METOPROLOL TARTRATE 50 MG: 50 TABLET, FILM COATED ORAL at 08:59

## 2025-08-27 RX ADMIN — Medication 5 ML: at 09:08

## 2025-08-27 ASSESSMENT — ENCOUNTER SYMPTOMS
VOMITING: 0
COLOR CHANGE: 0
WHEEZING: 0
SHORTNESS OF BREATH: 0
NAUSEA: 0
COUGH: 0
CHEST TIGHTNESS: 0
TROUBLE SWALLOWING: 0

## 2025-08-27 ASSESSMENT — PAIN SCALES - GENERAL
PAINLEVEL_OUTOF10: 0

## 2025-08-28 VITALS
WEIGHT: 174.1 LBS | SYSTOLIC BLOOD PRESSURE: 153 MMHG | TEMPERATURE: 97.3 F | HEIGHT: 67 IN | HEART RATE: 80 BPM | BODY MASS INDEX: 27.33 KG/M2 | RESPIRATION RATE: 19 BRPM | OXYGEN SATURATION: 100 % | DIASTOLIC BLOOD PRESSURE: 84 MMHG

## 2025-08-28 LAB
ALBUMIN SERPL-MCNC: 3.5 G/DL (ref 3.5–4.6)
ALP SERPL-CCNC: 75 U/L (ref 35–104)
ALT SERPL-CCNC: 32 U/L (ref 0–41)
ANION GAP SERPL CALCULATED.3IONS-SCNC: 12 MEQ/L (ref 9–15)
AST SERPL-CCNC: 32 U/L (ref 0–40)
BACTERIA BLD CULT ORG #2: NORMAL
BACTERIA BLD CULT: NORMAL
BASOPHILS # BLD: 0.1 K/UL (ref 0–0.2)
BASOPHILS NFR BLD: 0.7 %
BILIRUB SERPL-MCNC: 0.5 MG/DL (ref 0.2–0.7)
BUN SERPL-MCNC: 11 MG/DL (ref 8–23)
CALCIUM SERPL-MCNC: 8.7 MG/DL (ref 8.5–9.9)
CHLORIDE SERPL-SCNC: 105 MEQ/L (ref 95–107)
CO2 SERPL-SCNC: 24 MEQ/L (ref 20–31)
CREAT SERPL-MCNC: 0.88 MG/DL (ref 0.7–1.2)
EOSINOPHIL # BLD: 0.5 K/UL (ref 0–0.7)
EOSINOPHIL NFR BLD: 3.8 %
ERYTHROCYTE [DISTWIDTH] IN BLOOD BY AUTOMATED COUNT: 12.7 % (ref 11.5–14.5)
GLOBULIN SER CALC-MCNC: 2.7 G/DL (ref 2.3–3.5)
GLUCOSE SERPL-MCNC: 106 MG/DL (ref 70–99)
HCT VFR BLD AUTO: 36.6 % (ref 42–52)
HGB BLD-MCNC: 12.6 G/DL (ref 14–18)
LYMPHOCYTES # BLD: 2.3 K/UL (ref 1–4.8)
LYMPHOCYTES NFR BLD: 19.3 %
MAGNESIUM SERPL-MCNC: 2.3 MG/DL (ref 1.7–2.4)
MCH RBC QN AUTO: 32.7 PG (ref 27–31.3)
MCHC RBC AUTO-ENTMCNC: 34.4 % (ref 33–37)
MCV RBC AUTO: 95.1 FL (ref 79–92.2)
MONOCYTES # BLD: 1.1 K/UL (ref 0.2–0.8)
MONOCYTES NFR BLD: 9.6 %
NEUTROPHILS # BLD: 7.7 K/UL (ref 1.4–6.5)
NEUTS SEG NFR BLD: 65.7 %
PLATELET # BLD AUTO: 204 K/UL (ref 130–400)
POTASSIUM SERPL-SCNC: 4.1 MEQ/L (ref 3.4–4.9)
PROT SERPL-MCNC: 6.2 G/DL (ref 6.3–8)
RBC # BLD AUTO: 3.85 M/UL (ref 4.7–6.1)
SODIUM SERPL-SCNC: 141 MEQ/L (ref 135–144)
WBC # BLD AUTO: 11.7 K/UL (ref 4.8–10.8)

## 2025-08-28 PROCEDURE — 95816 EEG AWAKE AND DROWSY: CPT

## 2025-08-28 PROCEDURE — 85025 COMPLETE CBC W/AUTO DIFF WBC: CPT

## 2025-08-28 PROCEDURE — 6360000002 HC RX W HCPCS

## 2025-08-28 PROCEDURE — 36415 COLL VENOUS BLD VENIPUNCTURE: CPT

## 2025-08-28 PROCEDURE — 6370000000 HC RX 637 (ALT 250 FOR IP)

## 2025-08-28 PROCEDURE — 80053 COMPREHEN METABOLIC PANEL: CPT

## 2025-08-28 PROCEDURE — 2500000003 HC RX 250 WO HCPCS

## 2025-08-28 PROCEDURE — 97116 GAIT TRAINING THERAPY: CPT

## 2025-08-28 PROCEDURE — APPSS15 APP SPLIT SHARED TIME 0-15 MINUTES: Performed by: NURSE PRACTITIONER

## 2025-08-28 PROCEDURE — 6370000000 HC RX 637 (ALT 250 FOR IP): Performed by: NURSE PRACTITIONER

## 2025-08-28 PROCEDURE — 83735 ASSAY OF MAGNESIUM: CPT

## 2025-08-28 PROCEDURE — 99232 SBSQ HOSP IP/OBS MODERATE 35: CPT | Performed by: PSYCHIATRY & NEUROLOGY

## 2025-08-28 RX ORDER — LEVETIRACETAM 500 MG/1
500 TABLET ORAL 2 TIMES DAILY
Qty: 60 TABLET | Refills: 1 | Status: SHIPPED | OUTPATIENT
Start: 2025-08-28 | End: 2025-09-05 | Stop reason: SDUPTHER

## 2025-08-28 RX ADMIN — METOPROLOL TARTRATE 50 MG: 50 TABLET, FILM COATED ORAL at 08:33

## 2025-08-28 RX ADMIN — LOSARTAN POTASSIUM 50 MG: 50 TABLET, FILM COATED ORAL at 08:33

## 2025-08-28 RX ADMIN — ROSUVASTATIN CALCIUM 20 MG: 20 TABLET, FILM COATED ORAL at 08:33

## 2025-08-28 RX ADMIN — ENOXAPARIN SODIUM 40 MG: 100 INJECTION SUBCUTANEOUS at 08:32

## 2025-08-28 RX ADMIN — LEVETIRACETAM 500 MG: 500 TABLET, FILM COATED ORAL at 08:33

## 2025-08-28 RX ADMIN — Medication 10 ML: at 08:33

## 2025-08-28 ASSESSMENT — ENCOUNTER SYMPTOMS
SHORTNESS OF BREATH: 0
CHEST TIGHTNESS: 0
NAUSEA: 0
COLOR CHANGE: 0
WHEEZING: 0
TROUBLE SWALLOWING: 0
VOMITING: 0
COUGH: 0

## 2025-08-29 ENCOUNTER — PATIENT OUTREACH (OUTPATIENT)
Dept: PRIMARY CARE | Facility: CLINIC | Age: 85
End: 2025-08-29
Payer: MEDICARE

## 2025-08-29 RX ORDER — LEVETIRACETAM 500 MG/1
500 TABLET ORAL 2 TIMES DAILY
COMMUNITY
Start: 2025-08-28

## 2025-08-31 ENCOUNTER — OFFICE VISIT (OUTPATIENT)
Dept: URGENT CARE | Age: 85
End: 2025-08-31
Payer: MEDICARE

## 2025-08-31 ENCOUNTER — ANCILLARY PROCEDURE (OUTPATIENT)
Dept: URGENT CARE | Age: 85
End: 2025-08-31
Payer: MEDICARE

## 2025-08-31 ASSESSMENT — ENCOUNTER SYMPTOMS
ALLERGIC/IMMUNOLOGIC NEGATIVE: 1
ENDOCRINE NEGATIVE: 1
HEMATOLOGIC/LYMPHATIC NEGATIVE: 1
MUSCULOSKELETAL NEGATIVE: 1
NEUROLOGICAL NEGATIVE: 1
RESPIRATORY NEGATIVE: 1
PALPITATIONS: 0
GASTROINTESTINAL NEGATIVE: 1
PSYCHIATRIC NEGATIVE: 1
CONSTITUTIONAL NEGATIVE: 1
EYES NEGATIVE: 1

## 2025-09-01 LAB
BACTERIA BLD CULT ORG #2: NORMAL
BACTERIA BLD CULT: NORMAL

## 2025-09-03 ENCOUNTER — APPOINTMENT (OUTPATIENT)
Dept: PRIMARY CARE | Facility: CLINIC | Age: 85
End: 2025-09-03
Payer: MEDICARE

## 2025-09-06 RX ORDER — LEVETIRACETAM 1000 MG/1
1000 TABLET ORAL DAILY
Qty: 30 TABLET | Refills: 3 | Status: SHIPPED | OUTPATIENT
Start: 2025-09-06

## 2025-09-17 ENCOUNTER — APPOINTMENT (OUTPATIENT)
Dept: PRIMARY CARE | Facility: CLINIC | Age: 85
End: 2025-09-17
Payer: MEDICARE

## 2025-09-17 ENCOUNTER — APPOINTMENT (OUTPATIENT)
Facility: CLINIC | Age: 85
End: 2025-09-17
Payer: MEDICARE

## 2026-01-27 ENCOUNTER — APPOINTMENT (OUTPATIENT)
Dept: CARDIOLOGY | Facility: CLINIC | Age: 86
End: 2026-01-27
Payer: MEDICARE

## 2026-04-28 ENCOUNTER — APPOINTMENT (OUTPATIENT)
Dept: CARDIOLOGY | Facility: CLINIC | Age: 86
End: 2026-04-28
Payer: MEDICARE

## 2026-07-17 ENCOUNTER — APPOINTMENT (OUTPATIENT)
Dept: PRIMARY CARE | Facility: CLINIC | Age: 86
End: 2026-07-17
Payer: MEDICARE

## 2026-07-24 ENCOUNTER — APPOINTMENT (OUTPATIENT)
Dept: PRIMARY CARE | Facility: CLINIC | Age: 86
End: 2026-07-24
Payer: MEDICARE